# Patient Record
Sex: FEMALE | Race: AMERICAN INDIAN OR ALASKA NATIVE | NOT HISPANIC OR LATINO | URBAN - METROPOLITAN AREA
[De-identification: names, ages, dates, MRNs, and addresses within clinical notes are randomized per-mention and may not be internally consistent; named-entity substitution may affect disease eponyms.]

---

## 2017-05-15 ENCOUNTER — EMERGENCY (EMERGENCY)
Facility: HOSPITAL | Age: 32
LOS: 0 days | Discharge: HOME | End: 2017-05-15

## 2017-06-28 DIAGNOSIS — R11.2 NAUSEA WITH VOMITING, UNSPECIFIED: ICD-10-CM

## 2017-06-28 DIAGNOSIS — R51 HEADACHE: ICD-10-CM

## 2018-06-24 ENCOUNTER — EMERGENCY (EMERGENCY)
Facility: HOSPITAL | Age: 33
LOS: 0 days | Discharge: HOME | End: 2018-06-24
Attending: EMERGENCY MEDICINE | Admitting: EMERGENCY MEDICINE
Payer: COMMERCIAL

## 2018-06-24 VITALS
DIASTOLIC BLOOD PRESSURE: 82 MMHG | SYSTOLIC BLOOD PRESSURE: 140 MMHG | RESPIRATION RATE: 18 BRPM | HEART RATE: 95 BPM | TEMPERATURE: 98 F | OXYGEN SATURATION: 99 %

## 2018-06-24 DIAGNOSIS — M79.89 OTHER SPECIFIED SOFT TISSUE DISORDERS: ICD-10-CM

## 2018-06-24 DIAGNOSIS — M79.662 PAIN IN LEFT LOWER LEG: ICD-10-CM

## 2018-06-24 PROCEDURE — 93970 EXTREMITY STUDY: CPT | Mod: 26

## 2018-06-24 NOTE — ED PROVIDER NOTE - MUSCULOSKELETAL NEGATIVE STATEMENT, MLM
+ left leg pain and swelling, no back pain, no gout, no musculoskeletal pain, no neck pain, and no weakness.

## 2018-06-24 NOTE — ED PROVIDER NOTE - MEDICAL DECISION MAKING DETAILS
Chart finished.  31 yo woman with left lower leg pain after recent trip around the [place].   Patient states that she did a lot of hiking but also a lot of traveling.  ? swelling.  Pain with movement.  Duplex negative.  NSAIDS and outpatient follow up.

## 2018-06-24 NOTE — ED PROVIDER NOTE - OBJECTIVE STATEMENT
31 yo F with hx of migraines, presents for evaluation of left leg swelling, onset a couple days ago, associated with tenderness to the left calf. No fever, no chills, no SOB, no chest pain, no back pain, no headache, no n/v/d. Patient states that she was on a road trip for 21 days, where she was in a car driving for 4 to 6 hours daily and her last drive was from [place] to [place] which was 22 hours. Patient denies any BC, any hormonal use. Patient LMP 3 days ago.

## 2018-10-02 PROBLEM — Z00.00 ENCOUNTER FOR PREVENTIVE HEALTH EXAMINATION: Status: ACTIVE | Noted: 2018-10-02

## 2018-10-04 ENCOUNTER — EMERGENCY (EMERGENCY)
Facility: HOSPITAL | Age: 33
LOS: 0 days | Discharge: HOME | End: 2018-10-04
Attending: EMERGENCY MEDICINE | Admitting: EMERGENCY MEDICINE

## 2018-10-04 VITALS
SYSTOLIC BLOOD PRESSURE: 133 MMHG | DIASTOLIC BLOOD PRESSURE: 83 MMHG | OXYGEN SATURATION: 97 % | RESPIRATION RATE: 16 BRPM | HEART RATE: 95 BPM | TEMPERATURE: 98 F

## 2018-10-04 DIAGNOSIS — O03.9 COMPLETE OR UNSPECIFIED SPONTANEOUS ABORTION WITHOUT COMPLICATION: ICD-10-CM

## 2018-10-04 DIAGNOSIS — O20.9 HEMORRHAGE IN EARLY PREGNANCY, UNSPECIFIED: ICD-10-CM

## 2018-10-04 LAB
ALBUMIN SERPL ELPH-MCNC: 4.6 G/DL — SIGNIFICANT CHANGE UP (ref 3.5–5.2)
ALP SERPL-CCNC: 60 U/L — SIGNIFICANT CHANGE UP (ref 30–115)
ALT FLD-CCNC: 8 U/L — SIGNIFICANT CHANGE UP (ref 0–41)
ANION GAP SERPL CALC-SCNC: 12 MMOL/L — SIGNIFICANT CHANGE UP (ref 7–14)
APPEARANCE UR: ABNORMAL
AST SERPL-CCNC: 13 U/L — SIGNIFICANT CHANGE UP (ref 0–41)
BASOPHILS # BLD AUTO: 0.02 K/UL — SIGNIFICANT CHANGE UP (ref 0–0.2)
BASOPHILS NFR BLD AUTO: 0.3 % — SIGNIFICANT CHANGE UP (ref 0–1)
BILIRUB SERPL-MCNC: 0.6 MG/DL — SIGNIFICANT CHANGE UP (ref 0.2–1.2)
BILIRUB UR-MCNC: NEGATIVE — SIGNIFICANT CHANGE UP
BLD GP AB SCN SERPL QL: SIGNIFICANT CHANGE UP
BUN SERPL-MCNC: 13 MG/DL — SIGNIFICANT CHANGE UP (ref 10–20)
CALCIUM SERPL-MCNC: 9.3 MG/DL — SIGNIFICANT CHANGE UP (ref 8.5–10.1)
CHLORIDE SERPL-SCNC: 101 MMOL/L — SIGNIFICANT CHANGE UP (ref 98–110)
CO2 SERPL-SCNC: 25 MMOL/L — SIGNIFICANT CHANGE UP (ref 17–32)
COLOR SPEC: YELLOW — SIGNIFICANT CHANGE UP
CREAT SERPL-MCNC: 0.7 MG/DL — SIGNIFICANT CHANGE UP (ref 0.7–1.5)
DIFF PNL FLD: ABNORMAL
EOSINOPHIL # BLD AUTO: 0.19 K/UL — SIGNIFICANT CHANGE UP (ref 0–0.7)
EOSINOPHIL NFR BLD AUTO: 2.5 % — SIGNIFICANT CHANGE UP (ref 0–8)
EPI CELLS # UR: ABNORMAL /HPF
GLUCOSE SERPL-MCNC: 91 MG/DL — SIGNIFICANT CHANGE UP (ref 70–99)
GLUCOSE UR QL: NEGATIVE MG/DL — SIGNIFICANT CHANGE UP
HCG SERPL-ACNC: 6.5 MIU/ML — HIGH
HCT VFR BLD CALC: 39 % — SIGNIFICANT CHANGE UP (ref 37–47)
HGB BLD-MCNC: 12.4 G/DL — SIGNIFICANT CHANGE UP (ref 12–16)
IMM GRANULOCYTES NFR BLD AUTO: 0.3 % — SIGNIFICANT CHANGE UP (ref 0.1–0.3)
KETONES UR-MCNC: NEGATIVE — SIGNIFICANT CHANGE UP
LEUKOCYTE ESTERASE UR-ACNC: NEGATIVE — SIGNIFICANT CHANGE UP
LYMPHOCYTES # BLD AUTO: 2.23 K/UL — SIGNIFICANT CHANGE UP (ref 1.2–3.4)
LYMPHOCYTES # BLD AUTO: 29.5 % — SIGNIFICANT CHANGE UP (ref 20.5–51.1)
MCHC RBC-ENTMCNC: 25.2 PG — LOW (ref 27–31)
MCHC RBC-ENTMCNC: 31.8 G/DL — LOW (ref 32–37)
MCV RBC AUTO: 79.3 FL — LOW (ref 81–99)
MONOCYTES # BLD AUTO: 0.45 K/UL — SIGNIFICANT CHANGE UP (ref 0.1–0.6)
MONOCYTES NFR BLD AUTO: 6 % — SIGNIFICANT CHANGE UP (ref 1.7–9.3)
NEUTROPHILS # BLD AUTO: 4.65 K/UL — SIGNIFICANT CHANGE UP (ref 1.4–6.5)
NEUTROPHILS NFR BLD AUTO: 61.4 % — SIGNIFICANT CHANGE UP (ref 42.2–75.2)
NITRITE UR-MCNC: NEGATIVE — SIGNIFICANT CHANGE UP
NRBC # BLD: 0 /100 WBCS — SIGNIFICANT CHANGE UP (ref 0–0)
PH UR: 6 — SIGNIFICANT CHANGE UP (ref 5–8)
PLATELET # BLD AUTO: 264 K/UL — SIGNIFICANT CHANGE UP (ref 130–400)
POTASSIUM SERPL-MCNC: 4.1 MMOL/L — SIGNIFICANT CHANGE UP (ref 3.5–5)
POTASSIUM SERPL-SCNC: 4.1 MMOL/L — SIGNIFICANT CHANGE UP (ref 3.5–5)
PROT SERPL-MCNC: 7.2 G/DL — SIGNIFICANT CHANGE UP (ref 6–8)
PROT UR-MCNC: NEGATIVE MG/DL — SIGNIFICANT CHANGE UP
RBC # BLD: 4.92 M/UL — SIGNIFICANT CHANGE UP (ref 4.2–5.4)
RBC # FLD: 14.3 % — SIGNIFICANT CHANGE UP (ref 11.5–14.5)
RBC CASTS # UR COMP ASSIST: ABNORMAL /HPF
SODIUM SERPL-SCNC: 138 MMOL/L — SIGNIFICANT CHANGE UP (ref 135–146)
SP GR SPEC: 1.02 — SIGNIFICANT CHANGE UP (ref 1.01–1.03)
TYPE + AB SCN PNL BLD: SIGNIFICANT CHANGE UP
UROBILINOGEN FLD QL: 0.2 MG/DL — SIGNIFICANT CHANGE UP (ref 0.2–0.2)
WBC # BLD: 7.56 K/UL — SIGNIFICANT CHANGE UP (ref 4.8–10.8)
WBC # FLD AUTO: 7.56 K/UL — SIGNIFICANT CHANGE UP (ref 4.8–10.8)

## 2018-10-04 NOTE — ED PROVIDER NOTE - MEDICAL DECISION MAKING DETAILS
Presented for vaginal bleeding with positive home pregnancy test. UCG here negative. Beta 6.5. U/S with no IUP. Likely completed Ab. Has f/u scheduled with GYN.

## 2018-10-04 NOTE — ED PROVIDER NOTE - PHYSICAL EXAMINATION
CONSTITUTIONAL: WA / WN / NAD  HEAD: NCAT  EYES: PERRL; EOMI; anicteric.  ENT: Normal pharynx; mucous membranes pink/moist, no erythema.  NECK: Supple; no meningeal signs  CARD: RRR; nl S1/S2; no M/R/G.   RESP: Respiratory rate and effort are normal; breath sounds clear and equal bilaterally.  ABD: Soft, NT ND. No CVA tenderness  : Chaperoned by PCA Liv. Scant vaginal bleeding seen. No CMT or adenexal tenderness  MSK/EXT: No gross deformities; full range of motion.  SKIN: Warm and dry;   NEURO: AAOx3  PSYCH: Memory Intact, Normal Affect

## 2018-10-04 NOTE — ED PROVIDER NOTE - OBJECTIVE STATEMENT
32 year old female presents here for vaginal bleeding.  (history of an  1 year ago). LMP 18. Patient has had two positive pregnancy tests at home and only scheduled an appointment with an ob has not seen an ob. Today while at work patient began having back cramps and abdominal cramps and noticed vaginal bleeding. Denies fever chills vomiting urinary frequency/urgency burning. No history of any STDS.

## 2018-10-04 NOTE — ED PROVIDER NOTE - PROGRESS NOTE DETAILS
UCG negative in ED. Beta 6.5. Pt states she had 2 positive pregnancy tests at home. Likely complete Ab. Will f/u U/S.

## 2018-10-04 NOTE — ED ADULT NURSE NOTE - OBJECTIVE STATEMENT
patient reports having 2 home pregnancy test LMP 9/2 with nausea. was at work when she had lower abdominal cramping back pain and vaginal bleeding. denies urinary complaints abdomen soft non tender to palpation

## 2018-10-04 NOTE — ED PROVIDER NOTE - ATTENDING CONTRIBUTION TO CARE
33 y/o female, G2 P 0010, ETOP 1 year ago, at ~4 wks gestation (LMP 9/2). c/o left pelvic cramping with back cramping this AM associated with slight vaginal bleeding. No tissue. No light-headedness. No SOB. No fever.   O/E: Non-toxic in appearance. No pallor, no jaundice. Lungs CTA b/l. S1 S2 regular, no murmur. ABD soft, no tenderness/guarding/rebound. No skin rash. No neurologic deficits.    Imp: R/O ectopic. Possible spontaneous Ab.  A/P: Beta, U/S.

## 2018-10-04 NOTE — ED PROVIDER NOTE - NS ED ROS FT
Constitutional: See HPI.  ENMT: No neck pain   Cardiac: No SOB or cp  Respiratory: No cough or respiratory distress.   GI: No nausea, vomiting, diarrhea + abdominal cramping  : No dysuria, frequency or burning. + vaginal bleeding  MS: + back pain.  Neuro: No headache or weakness. No LOC.  Skin: No skin rash.

## 2018-10-24 ENCOUNTER — APPOINTMENT (OUTPATIENT)
Dept: OBGYN | Facility: CLINIC | Age: 33
End: 2018-10-24

## 2018-10-30 ENCOUNTER — OUTPATIENT (OUTPATIENT)
Dept: OUTPATIENT SERVICES | Facility: HOSPITAL | Age: 33
LOS: 1 days | Discharge: HOME | End: 2018-10-30

## 2018-10-30 ENCOUNTER — APPOINTMENT (OUTPATIENT)
Dept: INTERNAL MEDICINE | Facility: CLINIC | Age: 33
End: 2018-10-30

## 2018-12-29 ENCOUNTER — OUTPATIENT (OUTPATIENT)
Dept: OUTPATIENT SERVICES | Facility: HOSPITAL | Age: 33
LOS: 1 days | Discharge: HOME | End: 2018-12-29

## 2018-12-29 DIAGNOSIS — Z34.00 ENCOUNTER FOR SUPERVISION OF NORMAL FIRST PREGNANCY, UNSPECIFIED TRIMESTER: ICD-10-CM

## 2019-01-31 ENCOUNTER — EMERGENCY (EMERGENCY)
Facility: HOSPITAL | Age: 34
LOS: 0 days | Discharge: HOME | End: 2019-02-01
Attending: EMERGENCY MEDICINE | Admitting: EMERGENCY MEDICINE

## 2019-01-31 VITALS
WEIGHT: 136.03 LBS | OXYGEN SATURATION: 100 % | SYSTOLIC BLOOD PRESSURE: 120 MMHG | DIASTOLIC BLOOD PRESSURE: 75 MMHG | HEART RATE: 80 BPM | TEMPERATURE: 98 F | HEIGHT: 66 IN | RESPIRATION RATE: 18 BRPM

## 2019-01-31 DIAGNOSIS — R10.2 PELVIC AND PERINEAL PAIN: ICD-10-CM

## 2019-01-31 DIAGNOSIS — R10.32 LEFT LOWER QUADRANT PAIN: ICD-10-CM

## 2019-01-31 DIAGNOSIS — Z3A.14 14 WEEKS GESTATION OF PREGNANCY: ICD-10-CM

## 2019-01-31 DIAGNOSIS — O99.89 OTHER SPECIFIED DISEASES AND CONDITIONS COMPLICATING PREGNANCY, CHILDBIRTH AND THE PUERPERIUM: ICD-10-CM

## 2019-01-31 LAB
ALBUMIN SERPL ELPH-MCNC: 3.8 G/DL — SIGNIFICANT CHANGE UP (ref 3.5–5.2)
ALP SERPL-CCNC: 50 U/L — SIGNIFICANT CHANGE UP (ref 30–115)
ALT FLD-CCNC: 16 U/L — SIGNIFICANT CHANGE UP (ref 0–41)
ANION GAP SERPL CALC-SCNC: 19 MMOL/L — HIGH (ref 7–14)
APTT BLD: 24 SEC — LOW (ref 27–39.2)
AST SERPL-CCNC: 17 U/L — SIGNIFICANT CHANGE UP (ref 0–41)
BASOPHILS # BLD AUTO: 0.01 K/UL — SIGNIFICANT CHANGE UP (ref 0–0.2)
BASOPHILS NFR BLD AUTO: 0.1 % — SIGNIFICANT CHANGE UP (ref 0–1)
BILIRUB DIRECT SERPL-MCNC: <0.2 MG/DL — SIGNIFICANT CHANGE UP (ref 0–0.2)
BILIRUB INDIRECT FLD-MCNC: >0.3 MG/DL — SIGNIFICANT CHANGE UP (ref 0.2–1.2)
BILIRUB SERPL-MCNC: 0.5 MG/DL — SIGNIFICANT CHANGE UP (ref 0.2–1.2)
BUN SERPL-MCNC: 5 MG/DL — LOW (ref 10–20)
CALCIUM SERPL-MCNC: 9.2 MG/DL — SIGNIFICANT CHANGE UP (ref 8.5–10.1)
CHLORIDE SERPL-SCNC: 98 MMOL/L — SIGNIFICANT CHANGE UP (ref 98–110)
CO2 SERPL-SCNC: 21 MMOL/L — SIGNIFICANT CHANGE UP (ref 17–32)
CREAT SERPL-MCNC: 0.5 MG/DL — LOW (ref 0.7–1.5)
EOSINOPHIL # BLD AUTO: 0.13 K/UL — SIGNIFICANT CHANGE UP (ref 0–0.7)
EOSINOPHIL NFR BLD AUTO: 1.7 % — SIGNIFICANT CHANGE UP (ref 0–8)
GLUCOSE SERPL-MCNC: 81 MG/DL — SIGNIFICANT CHANGE UP (ref 70–99)
HCG SERPL-ACNC: HIGH MIU/ML
HCT VFR BLD CALC: 36 % — LOW (ref 37–47)
HGB BLD-MCNC: 12 G/DL — SIGNIFICANT CHANGE UP (ref 12–16)
IMM GRANULOCYTES NFR BLD AUTO: 0.3 % — SIGNIFICANT CHANGE UP (ref 0.1–0.3)
INR BLD: 1.04 RATIO — SIGNIFICANT CHANGE UP (ref 0.65–1.3)
LYMPHOCYTES # BLD AUTO: 1.89 K/UL — SIGNIFICANT CHANGE UP (ref 1.2–3.4)
LYMPHOCYTES # BLD AUTO: 24.7 % — SIGNIFICANT CHANGE UP (ref 20.5–51.1)
MCHC RBC-ENTMCNC: 25.9 PG — LOW (ref 27–31)
MCHC RBC-ENTMCNC: 33.3 G/DL — SIGNIFICANT CHANGE UP (ref 32–37)
MCV RBC AUTO: 77.8 FL — LOW (ref 81–99)
MONOCYTES # BLD AUTO: 0.49 K/UL — SIGNIFICANT CHANGE UP (ref 0.1–0.6)
MONOCYTES NFR BLD AUTO: 6.4 % — SIGNIFICANT CHANGE UP (ref 1.7–9.3)
NEUTROPHILS # BLD AUTO: 5.1 K/UL — SIGNIFICANT CHANGE UP (ref 1.4–6.5)
NEUTROPHILS NFR BLD AUTO: 66.8 % — SIGNIFICANT CHANGE UP (ref 42.2–75.2)
NRBC # BLD: 0 /100 WBCS — SIGNIFICANT CHANGE UP (ref 0–0)
PLATELET # BLD AUTO: 214 K/UL — SIGNIFICANT CHANGE UP (ref 130–400)
POTASSIUM SERPL-MCNC: 4.3 MMOL/L — SIGNIFICANT CHANGE UP (ref 3.5–5)
POTASSIUM SERPL-SCNC: 4.3 MMOL/L — SIGNIFICANT CHANGE UP (ref 3.5–5)
PROT SERPL-MCNC: 6.5 G/DL — SIGNIFICANT CHANGE UP (ref 6–8)
PROTHROM AB SERPL-ACNC: 12 SEC — SIGNIFICANT CHANGE UP (ref 9.95–12.87)
RBC # BLD: 4.63 M/UL — SIGNIFICANT CHANGE UP (ref 4.2–5.4)
RBC # FLD: 13.7 % — SIGNIFICANT CHANGE UP (ref 11.5–14.5)
SODIUM SERPL-SCNC: 138 MMOL/L — SIGNIFICANT CHANGE UP (ref 135–146)
WBC # BLD: 7.64 K/UL — SIGNIFICANT CHANGE UP (ref 4.8–10.8)
WBC # FLD AUTO: 7.64 K/UL — SIGNIFICANT CHANGE UP (ref 4.8–10.8)

## 2019-01-31 RX ORDER — IBUPROFEN 200 MG
400 TABLET ORAL ONCE
Qty: 0 | Refills: 0 | Status: DISCONTINUED | OUTPATIENT
Start: 2019-01-31 | End: 2019-02-01

## 2019-01-31 RX ORDER — SODIUM CHLORIDE 9 MG/ML
1000 INJECTION INTRAMUSCULAR; INTRAVENOUS; SUBCUTANEOUS ONCE
Qty: 0 | Refills: 0 | Status: COMPLETED | OUTPATIENT
Start: 2019-01-31 | End: 2019-01-31

## 2019-01-31 RX ADMIN — SODIUM CHLORIDE 1000 MILLILITER(S): 9 INJECTION INTRAMUSCULAR; INTRAVENOUS; SUBCUTANEOUS at 23:01

## 2019-01-31 NOTE — ED ADULT NURSE NOTE - OBJECTIVE STATEMENT
Pt c/o intermittent lower abd pain x couple of hours. Pt is 14wks pregnant. Pt c/o nausea, denies v/d. Denies vaginal bleeding. Denies trauma, denies fevers/chills. Pt states she feels movement within womb.

## 2019-01-31 NOTE — CONSULT NOTE ADULT - SUBJECTIVE AND OBJECTIVE BOX
DEVON FULLER   33y   Female   [id number]    Chief Complaint: Abdominal cramping at 13w1d pregnancy    HPI: 32 yo   at 13w1d w/ JOHNNY of 2019 by LMP consistent with 1st trimester sonogram here for cramping abdominal pain that started 3 days ago. In the beginning it was very mild, 3-4/10 intensity, would come and go for a couple of minutes. However, this morning at 0730, pt felt that the contractions were getting stronger, came to L&D and was told it is most likely due to dehydration as pt is experiencing severe N/V since the beginning of the pregnancy. Then this evening at 1955, pt woke up from a nap with 10/10 intensity cramping abdominal pain that is very similar to menstrual cramps, starts in the middle of her lower pelvic area and radiates to the sides. This lasted for 10-12 minutes and pt was brought into the ER by EMS. Denies vaginal bleeding, abnormal vaginal discharge, fever/chills, dysuria, frequency, urgency, diarrhea, SOB, chest pain, palpitations, cough, sore throat, sick contact and recent travel. Desired and planned pregnancy. No complications, pt following with Dr. Marin.     MEDICATIONS:  None    ALLERGIES:   NKDA    PAST MEDICAL & SURGICAL HISTORY:  Migraine - no ppx  Dilation and curettage    OB/GYN HISTORY:      Gyn: LMP: 10/31/2018, regular cycles; denies history of abnormal pap, STI, ovarian cysts, or uterine fibroids  Obstetric: ; ETOP w/ D&C; MAB w/o D&C   Last Pap smear: never had one    FAMILY HISTORY:  Hypertension - mother  Diabetes - mother  Endometrial cancer s/p hysterectomy - mother  Coronary artery disease - father    SOCIAL HISTORY:   Denies cigarette use, alcohol use, or illicit drug use    REVIEW OF SYSTEMS:  CONSTITUTIONAL: No weakness, fevers or chills  EYES/ENT: No visual changes;  No vertigo or throat pain   NECK: No pain or stiffness  RESPIRATORY: No cough, wheezing, hemoptysis; No shortness of breath  CARDIOVASCULAR: No chest pain or palpitations  GASTROINTESTINAL: No abdominal or epigastric pain. No hematemesis; No diarrhea or constipation. No melena or hematochezia.  GENITOURINARY: No dysuria, frequency or hematuria  NEUROLOGICAL: No numbness or weakness  SKIN: No itching, rashes  All other negative    Vital Signs Last 24 Hrs  T(C): 36.4 (2019 20:44), Max: 36.4 (2019 20:44)  T(F): 97.6 (2019 20:44), Max: 97.6 (2019 20:44)  HR: 80 (2019 20:44) (80 - 80)  BP: 120/75 (2019 20:44) (120/75 - 120/75)  RR: 18 (2019 20:44) (18 - 18)  SpO2: 100% (2019 20:44) (100% - 100%)    Physical Exam:  Constitutional: AAOx3, NAD  Gastrointestinal: Soft, nontender, nondistended, no rebound, guarding, or rigidity  Pelvic: Normal vulva, normal vagina, no bleeding, Cervix normal, closed, no bleeding, no abnormal discharge, Uterus anteverted, normal sized, no fundal tenderness, no adnexal masses or tenderness    LABS:                        12.0   7.64  )-----------( 214      ( 2019 22:42 )             36.0     HCG Quantitative, Serum: 09075.0 mIU/mL (19 @ 22:42)        138  |  98  |  5<L>  ----------------------------<  81  4.3   |  21  |  0.5<L>    Ca    9.2      2019 22:42    TPro  6.5  /  Alb  3.8  /  TBili  0.5  /  DBili  <0.2  /  AST  17  /  ALT  16  /  AlkPhos  50      PT/INR - ( 2019 22:42 )   PT: 12.00 sec;   INR: 1.04 ratio         PTT - ( 2019 22:42 )  PTT:24.0 sec      RADIOLOGY & ADDITIONAL STUDIES:    TVUS:    FINDINGS:     The uterus measures 13.7 x 10.4 x 6.8 cm containing a single intrauterine   pregnancy with crown-rump length measuring 6.9 cm corresponding to a   gestational age of 13 weeks and 1 day.  Fetal heart rate is 155   beats/min.     There is no adnexal mass or free pelvic fluid.    The right ovary is not visualized due to overlying bowel gas. The left   ovary measures 2.1 x 1.3 x 1.4 cm. containing a 1.9 cm complex cyst.    Doppler flow is demonstrated to left ovary.    IMPRESSION:  Single live intrauterine pregnancy corresponding to a gestational age of   13 weeks and 1 day.    Fetal heart rate at 155 beats/min.    1.9 cm left ovarian complex cyst. No evidence of left ovarian torsion.    Nonvisualization of the right ovary due to overlying bowel gas.    Note this examination is not tailored for detailed evaluation of fetal   anatomy.    RETROPERITONEAL US:    FINDINGS:    RIGHT KIDNEY: Normal in echogenicity, size measuring 9.5 cm in length. No   evidence of hydronephrosis, calculus or solid mass. Vascular flow is   demonstrated at the hilum.    LEFT KIDNEY: Normal in echogenicity, size measuring 10.9 cm in length. No   evidence of hydronephrosis, calculus or solid mass. Vascular flow is   demonstrated at the hilum.     URINARY BLADDER: Prevoid volume of approximately 269 cc.  No wall   thickening, debris or calculus is seen. Bilateral ureteral jets are   visualized. Postvoid volume is approximately 0 cc.    IMPRESSION:  Unremarkable renal and bladder ultrasound. DEVON FULLER   33y   Female   [id number]    Chief Complaint: Abdominal cramping at 13w1d pregnancy    HPI: 32 yo  at 13w1d w/ JOHNNY of 2019 by LMP consistent with 1st trimester sonogram here for cramping abdominal pain that started 3 days ago. In the beginning it was very mild, 3-4/10 intensity, would come and go for a couple of minutes. However, this morning at 0730, pt felt that the contractions were getting stronger, came to L&D and was told it is most likely due to dehydration as pt is experiencing severe N/V since the beginning of the pregnancy. Then this evening at 1955, pt woke up from a nap with 10/10 intensity cramping abdominal pain that is very similar to menstrual cramps, starts in the middle of her lower pelvic area and radiates to the sides. This lasted for 10-12 minutes and pt was brought into the ER by EMS. Denies vaginal bleeding, abnormal vaginal discharge, fever/chills, dysuria, frequency, urgency, diarrhea, SOB, chest pain, palpitations, cough, sore throat, sick contact and recent travel. Desired and planned pregnancy. No complications, pt following with Dr. Marin.     MEDICATIONS:  None    ALLERGIES:   NKDA    PAST MEDICAL & SURGICAL HISTORY:  Migraine - no ppx  Dilation and curettage    OB/GYN HISTORY:      Gyn: LMP: 10/31/2018, regular cycles; denies history of abnormal pap, STI, ovarian cysts, or uterine fibroids  Obstetric: ; ETOP w/ D&C; MAB w/o D&C   Last Pap smear: never had one    FAMILY HISTORY:  Hypertension - mother  Diabetes - mother  Endometrial cancer s/p hysterectomy - mother  Coronary artery disease - father    SOCIAL HISTORY:   Denies cigarette use, alcohol use, or illicit drug use    REVIEW OF SYSTEMS:  CONSTITUTIONAL: No weakness, fevers or chills  EYES/ENT: No visual changes;  No vertigo or throat pain   NECK: No pain or stiffness  RESPIRATORY: No cough, wheezing, hemoptysis; No shortness of breath  CARDIOVASCULAR: No chest pain or palpitations  GASTROINTESTINAL: No abdominal or epigastric pain. No hematemesis; No diarrhea or constipation. No melena or hematochezia.  GENITOURINARY: No dysuria, frequency or hematuria  NEUROLOGICAL: No numbness or weakness  SKIN: No itching, rashes  All other negative    Vital Signs Last 24 Hrs  T(C): 36.4 (2019 20:44), Max: 36.4 (2019 20:44)  T(F): 97.6 (2019 20:44), Max: 97.6 (2019 20:44)  HR: 80 (2019 20:44) (80 - 80)  BP: 120/75 (2019 20:44) (120/75 - 120/75)  RR: 18 (2019 20:44) (18 - 18)  SpO2: 100% (2019 20:44) (100% - 100%)    Physical Exam:  Constitutional: AAOx3, NAD  Gastrointestinal: Soft, nontender, nondistended, no rebound, guarding, or rigidity  Pelvic: Normal vulva, normal vagina, no bleeding, Cervix normal, closed, no bleeding, no abnormal discharge, no CMT, uterus anteverted, 13w-sized, no fundal tenderness, no adnexal masses or tenderness    LABS:                        12.0   7.64  )-----------( 214      ( 2019 22:42 )             36.0     HCG Quantitative, Serum: 35877.0 mIU/mL (19 @ 22:42)        138  |  98  |  5<L>  ----------------------------<  81  4.3   |  21  |  0.5<L>    Ca    9.2      2019 22:42    TPro  6.5  /  Alb  3.8  /  TBili  0.5  /  DBili  <0.2  /  AST  17  /  ALT  16  /  AlkPhos  50      PT/INR - ( 2019 22:42 )   PT: 12.00 sec;   INR: 1.04 ratio         PTT - ( 2019 22:42 )  PTT:24.0 sec      RADIOLOGY & ADDITIONAL STUDIES:    TVUS:    FINDINGS:     The uterus measures 13.7 x 10.4 x 6.8 cm containing a single intrauterine   pregnancy with crown-rump length measuring 6.9 cm corresponding to a   gestational age of 13 weeks and 1 day.  Fetal heart rate is 155   beats/min.     There is no adnexal mass or free pelvic fluid.    The right ovary is not visualized due to overlying bowel gas. The left   ovary measures 2.1 x 1.3 x 1.4 cm. containing a 1.9 cm complex cyst.    Doppler flow is demonstrated to left ovary.    IMPRESSION:  Single live intrauterine pregnancy corresponding to a gestational age of   13 weeks and 1 day.    Fetal heart rate at 155 beats/min.    1.9 cm left ovarian complex cyst. No evidence of left ovarian torsion.    Nonvisualization of the right ovary due to overlying bowel gas.    Note this examination is not tailored for detailed evaluation of fetal   anatomy.    RETROPERITONEAL US:    FINDINGS:    RIGHT KIDNEY: Normal in echogenicity, size measuring 9.5 cm in length. No   evidence of hydronephrosis, calculus or solid mass. Vascular flow is   demonstrated at the hilum.    LEFT KIDNEY: Normal in echogenicity, size measuring 10.9 cm in length. No   evidence of hydronephrosis, calculus or solid mass. Vascular flow is   demonstrated at the hilum.     URINARY BLADDER: Prevoid volume of approximately 269 cc.  No wall   thickening, debris or calculus is seen. Bilateral ureteral jets are   visualized. Postvoid volume is approximately 0 cc.    IMPRESSION:  Unremarkable renal and bladder ultrasound.

## 2019-01-31 NOTE — ED ADULT NURSE NOTE - PSH
No significant past surgical history
Anxiety    Chronic cough    Hypothyroidism    IBS (irritable bowel syndrome)    Tracheomalacia

## 2019-01-31 NOTE — ED ADULT TRIAGE NOTE - CHIEF COMPLAINT QUOTE
Pt c/o lower abdominal pain and is 14 weeks pregnant. pain started approximately 1 hr ago Pt admits to nausea but denies vomiting and vaginal bleeding. Pt states " pain was worse before now it has improved"

## 2019-01-31 NOTE — ED ADULT TRIAGE NOTE - NS ED NURSE BANDS TYPE
Health Maintenance Summary     Topic Due On Due Status Completed On Postpone Until Reason    MAMMOGRAM - BREAST CANCER SCREENING Oct 24, 2018 Not Due Oct 24, 2016      Osteoporosis Screening Sep 20, 2015 Postponed  Jan 9, 2017 Patient Refused    Colorectal Cancer Screening - Blood in Stool Test Jun 27, 2017 Not Due Jun 27, 2016      Immunization - Td/Tdap Jul 17, 2021 Not Due Jul 17, 2011      Immunization-Zoster Sep 20, 2010 Postponed  Nov 30, 2017 Patient Refused    Immunization - Pneumococcal Sep 20, 2015 Postponed  Nov 30, 2017 Patient Refused    Immunization - TDAP Pregnancy  Hidden       Medicare Wellness Visit Jan 9, 2018 Not Due Jan 9, 2017      Immunization-Influenza Sep 1, 2016 Postponed  Apr 1, 2017 Patient Refused          Patient is due for topics as listed above, she wishes to decline at this time .    Health Maintenance Summary     Topic Due On Due Status Completed On Postpone Until Reason    MAMMOGRAM - BREAST CANCER SCREENING Oct 24, 2018 Not Due Oct 24, 2016      Osteoporosis Screening Sep 20, 2015 Postponed  Jan 9, 2017 Patient Refused    Colorectal Cancer Screening - Blood in Stool Test Jun 27, 2017 Not Due Jun 27, 2016      Immunization - Td/Tdap Jul 17, 2021 Not Due Jul 17, 2011      Immunization-Zoster Sep 20, 2010 Postponed  Nov 30, 2017 Patient Refused    Immunization - Pneumococcal Sep 20, 2015 Postponed  Nov 30, 2017 Patient Refused    Immunization - TDAP Pregnancy  Hidden       Medicare Wellness Visit Jan 9, 2018 Not Due Jan 9, 2017      Immunization-Influenza Sep 1, 2016 Postponed  Apr 1, 2017 Patient Refused          Patient is due for topics as listed above, she wishes to decline at this time .       Name band;

## 2019-01-31 NOTE — ED ADULT NURSE NOTE - NSIMPLEMENTINTERV_GEN_ALL_ED
Implemented All Universal Safety Interventions:  [place] to call system. Call bell, personal items and telephone within reach. Instruct patient to call for assistance. Room bathroom lighting operational. Non-slip footwear when patient is off stretcher. Physically safe environment: no spills, clutter or unnecessary equipment. Stretcher in lowest position, wheels locked, appropriate side rails in place.

## 2019-01-31 NOTE — CONSULT NOTE ADULT - ASSESSMENT
34 yo  at 13w1d w/ JOHNNY of 2019 w/ abdominal cramping, most likely due to dehydration, hemodynamically and clinically stable,     -F/u w/ PMD as scheduled  -Encourage hydration  -Miscarriage and ovarian torsion precautions given  -Disposition of pt per the ED team    Dr. Martinez and Dr. Marin aware.

## 2019-02-01 VITALS — HEART RATE: 80 BPM | RESPIRATION RATE: 18 BRPM | DIASTOLIC BLOOD PRESSURE: 69 MMHG | SYSTOLIC BLOOD PRESSURE: 105 MMHG

## 2019-02-01 LAB
APPEARANCE UR: ABNORMAL
BILIRUB UR-MCNC: NEGATIVE — SIGNIFICANT CHANGE UP
BLD GP AB SCN SERPL QL: SIGNIFICANT CHANGE UP
COLOR SPEC: YELLOW — SIGNIFICANT CHANGE UP
DIFF PNL FLD: NEGATIVE — SIGNIFICANT CHANGE UP
EPI CELLS # UR: ABNORMAL /HPF
GLUCOSE UR QL: NEGATIVE MG/DL — SIGNIFICANT CHANGE UP
KETONES UR-MCNC: 40
LEUKOCYTE ESTERASE UR-ACNC: NEGATIVE — SIGNIFICANT CHANGE UP
NITRITE UR-MCNC: NEGATIVE — SIGNIFICANT CHANGE UP
PH UR: 6 — SIGNIFICANT CHANGE UP (ref 5–8)
PROT UR-MCNC: NEGATIVE MG/DL — SIGNIFICANT CHANGE UP
SP GR SPEC: 1.01 — SIGNIFICANT CHANGE UP (ref 1.01–1.03)
TYPE + AB SCN PNL BLD: SIGNIFICANT CHANGE UP
UROBILINOGEN FLD QL: 0.2 MG/DL — SIGNIFICANT CHANGE UP (ref 0.2–0.2)

## 2019-02-01 RX ADMIN — SODIUM CHLORIDE 1000 MILLILITER(S): 9 INJECTION INTRAMUSCULAR; INTRAVENOUS; SUBCUTANEOUS at 00:23

## 2019-02-01 NOTE — ED PROVIDER NOTE - GASTROINTESTINAL, MLM
+BS, mild suprapubic and LLQ tenderness to deep palpation, no rebound, no guarding, ND, soft, no CVA tenderness.

## 2019-02-01 NOTE — ED PROVIDER NOTE - OBJECTIVE STATEMENT
patient states that 3 days ago started having lower abd cramping, had called her OBGYN doctor, was told may be from dehydration and was encouraged to drink adequate liquids to hydrate orally, patient states that she has been doing it, today morning pain got worse, so went to the L&D and did not see the doctors since they were busy with shift change, so she went home, around 1130 am had another episode of severe pain, which improved, later in the evening started having severe lower abdominal cramping, mostly LLQ area with radiation to LT flank, which continued, took tylenol and 911 was called, was brought to ED for evaluation. Patient states that pain had improved by the time she arrived to the ED, and now feels better, denies vaginal bleeding, denies vaginal d/c, denies urinary symptoms. Patient is 14 wks pregnant, stated had recently US and OBGYN evaluation, was told 13 wks and 4 days, no travel, no trauma, no sick contacts.

## 2019-02-01 NOTE — ED PROVIDER NOTE - MEDICAL DECISION MAKING DETAILS
Patient remained stable in ER, improved well during the course of ER stay. Patient is seen by OBGYN and cleared by them for discharge, Patient states is feeling lot better, want to go home and f/u as out-patient. Patient is awake, alert, o x 3, ambulatory comfortable, tolerated PO. Discussed with patient in detail about his clinical condition, results of the diagnostic studies and the need for close out-patient follow up. Detail aftercare instructions and return precautions are given.

## 2019-02-02 LAB
CULTURE RESULTS: SIGNIFICANT CHANGE UP
SPECIMEN SOURCE: SIGNIFICANT CHANGE UP

## 2019-05-18 ENCOUNTER — OUTPATIENT (OUTPATIENT)
Dept: OUTPATIENT SERVICES | Facility: HOSPITAL | Age: 34
LOS: 1 days | Discharge: HOME | End: 2019-05-18

## 2019-05-18 DIAGNOSIS — Z34.00 ENCOUNTER FOR SUPERVISION OF NORMAL FIRST PREGNANCY, UNSPECIFIED TRIMESTER: ICD-10-CM

## 2019-05-20 ENCOUNTER — EMERGENCY (EMERGENCY)
Facility: HOSPITAL | Age: 34
LOS: 0 days | Discharge: HOME | End: 2019-05-20
Attending: EMERGENCY MEDICINE | Admitting: EMERGENCY MEDICINE
Payer: COMMERCIAL

## 2019-05-20 DIAGNOSIS — O99.89 OTHER SPECIFIED DISEASES AND CONDITIONS COMPLICATING PREGNANCY, CHILDBIRTH AND THE PUERPERIUM: ICD-10-CM

## 2019-05-20 DIAGNOSIS — R09.81 NASAL CONGESTION: ICD-10-CM

## 2019-05-20 LAB
FLU A RESULT: NEGATIVE — SIGNIFICANT CHANGE UP
FLU A RESULT: NEGATIVE — SIGNIFICANT CHANGE UP
FLUAV AG NPH QL: NEGATIVE — SIGNIFICANT CHANGE UP
FLUBV AG NPH QL: NEGATIVE — SIGNIFICANT CHANGE UP
RSV RESULT: NEGATIVE — SIGNIFICANT CHANGE UP
RSV RNA RESP QL NAA+PROBE: NEGATIVE — SIGNIFICANT CHANGE UP

## 2019-05-20 PROCEDURE — 99283 EMERGENCY DEPT VISIT LOW MDM: CPT

## 2019-05-20 NOTE — ED PROVIDER NOTE - NS ED ROS FT
Review of Systems:  •	CONSTITUTIONAL - No fever, No diaphoresis, No weight change  •	SKIN - No rash  •	HEMATOLOGIC - No abnormal bleeding or bruising  •	EYES - No eye pain, No blurred vision  •	ENT - as per hpi  •	RESPIRATORY - No shortness of breath, No cough  •	CARDIAC -No chest pain, No palpitations  •	GI - No abdominal pain, No nausea, No vomiting, No diarrhea, No constipation, No bright red blood per rectum or melena. No flank pain  •                 - No dysuria, frequency, hematuria.   •	ENDO - No polydypsia, No polyuria, No heat/cold intolerance  •	MUSCULOSKELETAL - No joint paint, No swelling, No back pain  •	NEUROLOGIC - No numbness, No focal weakness, No headache, No dizziness  All other systems negative, unless specified in HPI

## 2019-05-20 NOTE — ED PROVIDER NOTE - ATTENDING CONTRIBUTION TO CARE
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 +tactile temp. +nasal congestion.  pt is pregnant.    pt well appearing, ambulating w/o distress, breathing comfortably on RA.  No current preg concerns.    flu swab --> tamiflu if pos given pregnancy.

## 2019-05-20 NOTE — ED ADULT NURSE NOTE - OBJECTIVE STATEMENT
pt requesting flu swab, second trimester of pregnancy complaining of body aches chills. +sick contacts. refused VS

## 2019-06-01 ENCOUNTER — OUTPATIENT (OUTPATIENT)
Dept: OUTPATIENT SERVICES | Facility: HOSPITAL | Age: 34
LOS: 1 days | Discharge: HOME | End: 2019-06-01

## 2019-06-01 DIAGNOSIS — Z34.00 ENCOUNTER FOR SUPERVISION OF NORMAL FIRST PREGNANCY, UNSPECIFIED TRIMESTER: ICD-10-CM

## 2019-06-05 ENCOUNTER — OUTPATIENT (OUTPATIENT)
Dept: OUTPATIENT SERVICES | Facility: HOSPITAL | Age: 34
LOS: 1 days | Discharge: HOME | End: 2019-06-05

## 2019-06-05 VITALS
HEART RATE: 75 BPM | SYSTOLIC BLOOD PRESSURE: 125 MMHG | DIASTOLIC BLOOD PRESSURE: 75 MMHG | RESPIRATION RATE: 18 BRPM | TEMPERATURE: 98 F

## 2019-06-05 VITALS — DIASTOLIC BLOOD PRESSURE: 77 MMHG | SYSTOLIC BLOOD PRESSURE: 122 MMHG | HEART RATE: 69 BPM

## 2019-06-05 DIAGNOSIS — Z98.890 OTHER SPECIFIED POSTPROCEDURAL STATES: Chronic | ICD-10-CM

## 2019-06-05 NOTE — OB PROVIDER TRIAGE NOTE - HISTORY OF PRESENT ILLNESS
32 yo  at 31w1d by LMP consistent with 1st trimester sonogram here for contractions that she felt at first at 2200 and then at 0030. Reports that they are resolving now, and were very mild 3/10 intensity, but they did scare her because she could feel her stomach tightening and felt like going to the bathroom more frequently than usual. Denies LOF and VB. Reports good FM. No complications in this pregnancy. Last BM was at 1600, last PO intake was at 2330 and last intercourse was 2 weeks ago. Denies fever/chills, N/V, diarrhea, abnormal vaginal discharge, dysuria, urgency, hematuria, SOB, palpitations, chest pain, cough, sore throat, runny nose and recent travel. Last PMD visit was on 6/3/2019, no VE was done.     SH: denies tobacco, alcohol and illicit drug use  Meds: none  Allergies: NKDA

## 2019-06-05 NOTE — OB PROVIDER TRIAGE NOTE - NSOBPROVIDERNOTE_OBGYN_ALL_OB_FT
32 yo  at 31w1d, GBS unknown, not in  labor,     - labor precautions/fetal kick count instructions given  -PO hydration/ambulation encouraged  -F/u w/ PMD as scheduled   -Discharge home    Dr. Ferro to be made aware. Dr. Marin aware.

## 2019-06-05 NOTE — OB PROVIDER TRIAGE NOTE - NSHPPHYSICALEXAM_GEN_ALL_CORE
Vital Signs Last 24 Hrs  T(F): 98.2 (05 Jun 2019 03:11) (98.2 - 98.2)  HR: 69 (05 Jun 2019 03:11) (69 - 75)  BP: 122/77 (05 Jun 2019 03:11) (122/77 - 125/75)  RR: 18 (05 Jun 2019 02:11) (18 - 18)    Udip: neg  EFM: 140/mod/accel+  [place]: occasional  SVE: C/L/P  Speculum: deferred due to pt preference and PMD request  Abd: NT, gravid, no palpable contractions  Bedside sono: deferred due to pt preference and PMD request

## 2019-06-05 NOTE — OB PROVIDER TRIAGE NOTE - NS_OBGYNHISTORY_OBGYN_ALL_OB_FT
OB Hx: ETOP x1 w/ D&C; chemical pregnancy?/SABx1 w/o D&C  GYN Hx: denies h/o fibroids, ovarian cysts, abnormal paps and STIs

## 2019-06-05 NOTE — OB PROVIDER TRIAGE NOTE - FAMILY HISTORY
Mother  Still living? Unknown  Family history of malignant neoplasm of endometrium, Age at diagnosis: Age Unknown  Family history of diabetes mellitus, Age at diagnosis: Age Unknown  Family history of hypertension, Age at diagnosis: Age Unknown     Father  Still living? Unknown  Family history of diabetes mellitus, Age at diagnosis: Age Unknown  Family history of hypertension, Age at diagnosis: Age Unknown

## 2019-07-04 ENCOUNTER — OUTPATIENT (OUTPATIENT)
Dept: OUTPATIENT SERVICES | Facility: HOSPITAL | Age: 34
LOS: 1 days | Discharge: HOME | End: 2019-07-04

## 2019-07-04 DIAGNOSIS — Z98.890 OTHER SPECIFIED POSTPROCEDURAL STATES: Chronic | ICD-10-CM

## 2019-07-04 DIAGNOSIS — N89.8 OTHER SPECIFIED NONINFLAMMATORY DISORDERS OF VAGINA: ICD-10-CM

## 2019-07-04 PROBLEM — G43.909 MIGRAINE, UNSPECIFIED, NOT INTRACTABLE, WITHOUT STATUS MIGRAINOSUS: Chronic | Status: ACTIVE | Noted: 2019-06-05

## 2019-07-26 ENCOUNTER — OUTPATIENT (OUTPATIENT)
Dept: OUTPATIENT SERVICES | Facility: HOSPITAL | Age: 34
LOS: 1 days | Discharge: HOME | End: 2019-07-26

## 2019-07-26 VITALS
HEART RATE: 73 BPM | SYSTOLIC BLOOD PRESSURE: 139 MMHG | TEMPERATURE: 99 F | DIASTOLIC BLOOD PRESSURE: 86 MMHG | RESPIRATION RATE: 18 BRPM

## 2019-07-26 VITALS — SYSTOLIC BLOOD PRESSURE: 139 MMHG | DIASTOLIC BLOOD PRESSURE: 86 MMHG | HEART RATE: 73 BPM

## 2019-07-26 DIAGNOSIS — Z98.890 OTHER SPECIFIED POSTPROCEDURAL STATES: Chronic | ICD-10-CM

## 2019-07-26 NOTE — OB PROVIDER TRIAGE NOTE - ADDITIONAL INSTRUCTIONS
- f/up at next scheduled appt w/ Dr. Marin 8/1  - labor precautions  - [facility] instructions, maternal hydration encouraged

## 2019-07-26 NOTE — OB PROVIDER TRIAGE NOTE - HISTORY OF PRESENT ILLNESS
32 yo  @38w2d by LMP and c/w  tri sono presents to L&D with complaint of loss of mucus plug at 1130 this morning. Also reports some minor back that began after exam yesterday in the office. Denies ctx, LOF, VB. Reports good fetal movements. No complications during this pregnancy. Was 2cm yesterday in office. GBS neg.

## 2019-07-26 NOTE — OB PROVIDER TRIAGE NOTE - NSHPLABSRESULTS_GEN_ALL_CORE
Labs:    Julio Cesar: Labs:  7/5/19  GBS: neg    6/1/19  measles: immune  HIV: NR    5/18/19  GCT: 109    12/29/18  blood type: O pos  Ab screen: neg  HIV: NR  RPR: neg  rubella: immune  varicella: immune  HepB: NR       seq1/2: low risk    Sonos:  33w: EFW 2266gms (56%), cephalic, 3vc, ant palcenta, MVP 5.8cm, BPP 8/8  18w5d: EFW 275gms, transverse, 3vc, ant placenta, nml fluid, cvx 3.7cm, nml anatomy  12w5d: single IUP, +FH, nt 1.3mm wnl

## 2019-07-26 NOTE — OB RN TRIAGE NOTE - FAMILY HISTORY
Mother  Still living? Unknown  Family history of malignant neoplasm of endometrium, Age at diagnosis: Age Unknown  Family history of diabetes mellitus, Age at diagnosis: Age Unknown  Family history of hypertension, Age at diagnosis: Age Unknown     Father  Still living? Unknown  Family history of diabetes mellitus, Age at diagnosis: Age Unknown  Family history of hypertension, Age at diagnosis: Age Unknown  Family history of myasthenia gravis, Age at diagnosis: Age Unknown

## 2019-07-26 NOTE — OB PROVIDER TRIAGE NOTE - NSOBPROVIDERNOTE_OBGYN_ALL_OB_FT
34 yo  @38w2d, GBS neg, measles immune, not in labor, reassuring fetal and maternal status.    - d/c to home  - f/up at next scheduled appt w/ Dr. Marin   - labor precautions  - [facility] instructions, maternal hydration encouraged    Dr. Velarde and Dr. Marin aware.

## 2019-07-26 NOTE — OB PROVIDER TRIAGE NOTE - NSHPPHYSICALEXAM_GEN_ALL_CORE
Vital Signs Last 24 Hrs  T(F): 98.9 (26 Jul 2019 13:23), Max: 98.9 (26 Jul 2019 13:23)  HR: 73 (26 Jul 2019 13:25) (73 - 73)  BP: 139/86 (26 Jul 2019 13:25) (139/86 - 139/86)  RR: 18 (26 Jul 2019 13:23) (18 - 18)    udip: trace blood    gen: AAOx3, nad  EFM: 130/mod mandeep/+accel  toco: irregular  SVE: 2/L/-3, vtx, intact  abd: soft, nontender, gravid, no palpable contractions

## 2019-07-28 ENCOUNTER — INPATIENT (INPATIENT)
Facility: HOSPITAL | Age: 34
LOS: 2 days | Discharge: HOME | End: 2019-07-31
Attending: OBSTETRICS & GYNECOLOGY | Admitting: OBSTETRICS & GYNECOLOGY

## 2019-07-28 VITALS — TEMPERATURE: 98 F

## 2019-07-28 DIAGNOSIS — Z98.890 OTHER SPECIFIED POSTPROCEDURAL STATES: Chronic | ICD-10-CM

## 2019-07-28 LAB
APPEARANCE UR: ABNORMAL
BACTERIA # UR AUTO: ABNORMAL
BASOPHILS # BLD AUTO: 0.01 K/UL — SIGNIFICANT CHANGE UP (ref 0–0.2)
BASOPHILS NFR BLD AUTO: 0.1 % — SIGNIFICANT CHANGE UP (ref 0–1)
BILIRUB UR-MCNC: NEGATIVE — SIGNIFICANT CHANGE UP
BLD GP AB SCN SERPL QL: SIGNIFICANT CHANGE UP
COLOR SPEC: SIGNIFICANT CHANGE UP
DIFF PNL FLD: ABNORMAL
EOSINOPHIL # BLD AUTO: 0.09 K/UL — SIGNIFICANT CHANGE UP (ref 0–0.7)
EOSINOPHIL NFR BLD AUTO: 1.2 % — SIGNIFICANT CHANGE UP (ref 0–8)
EPI CELLS # UR: 4 /HPF — SIGNIFICANT CHANGE UP (ref 0–5)
GLUCOSE UR QL: NEGATIVE — SIGNIFICANT CHANGE UP
HCT VFR BLD CALC: 33.3 % — LOW (ref 37–47)
HGB BLD-MCNC: 10.2 G/DL — LOW (ref 12–16)
HYALINE CASTS # UR AUTO: 2 /LPF — SIGNIFICANT CHANGE UP (ref 0–7)
IMM GRANULOCYTES NFR BLD AUTO: 0.5 % — HIGH (ref 0.1–0.3)
KETONES UR-MCNC: NEGATIVE — SIGNIFICANT CHANGE UP
LEUKOCYTE ESTERASE UR-ACNC: ABNORMAL
LYMPHOCYTES # BLD AUTO: 1.85 K/UL — SIGNIFICANT CHANGE UP (ref 1.2–3.4)
LYMPHOCYTES # BLD AUTO: 25.1 % — SIGNIFICANT CHANGE UP (ref 20.5–51.1)
MCHC RBC-ENTMCNC: 21.6 PG — LOW (ref 27–31)
MCHC RBC-ENTMCNC: 30.6 G/DL — LOW (ref 32–37)
MCV RBC AUTO: 70.4 FL — LOW (ref 81–99)
MONOCYTES # BLD AUTO: 0.43 K/UL — SIGNIFICANT CHANGE UP (ref 0.1–0.6)
MONOCYTES NFR BLD AUTO: 5.8 % — SIGNIFICANT CHANGE UP (ref 1.7–9.3)
NEUTROPHILS # BLD AUTO: 4.95 K/UL — SIGNIFICANT CHANGE UP (ref 1.4–6.5)
NEUTROPHILS NFR BLD AUTO: 67.3 % — SIGNIFICANT CHANGE UP (ref 42.2–75.2)
NITRITE UR-MCNC: NEGATIVE — SIGNIFICANT CHANGE UP
NRBC # BLD: 0 /100 WBCS — SIGNIFICANT CHANGE UP (ref 0–0)
PH UR: 6.5 — SIGNIFICANT CHANGE UP (ref 5–8)
PLATELET # BLD AUTO: 236 K/UL — SIGNIFICANT CHANGE UP (ref 130–400)
PRENATAL SYPHILIS TEST: SIGNIFICANT CHANGE UP
PROT UR-MCNC: NEGATIVE — SIGNIFICANT CHANGE UP
RBC # BLD: 4.73 M/UL — SIGNIFICANT CHANGE UP (ref 4.2–5.4)
RBC # FLD: 15.9 % — HIGH (ref 11.5–14.5)
RBC CASTS # UR COMP ASSIST: 1 /HPF — SIGNIFICANT CHANGE UP (ref 0–4)
SP GR SPEC: 1.01 — SIGNIFICANT CHANGE UP (ref 1.01–1.02)
UROBILINOGEN FLD QL: SIGNIFICANT CHANGE UP
WBC # BLD: 7.37 K/UL — SIGNIFICANT CHANGE UP (ref 4.8–10.8)
WBC # FLD AUTO: 7.37 K/UL — SIGNIFICANT CHANGE UP (ref 4.8–10.8)
WBC UR QL: 37 /HPF — HIGH (ref 0–5)

## 2019-07-28 RX ORDER — DIPHENHYDRAMINE HCL 50 MG
25 CAPSULE ORAL ONCE
Refills: 0 | Status: COMPLETED | OUTPATIENT
Start: 2019-07-28 | End: 2019-07-28

## 2019-07-28 RX ORDER — BUTORPHANOL TARTRATE 2 MG/ML
2 INJECTION, SOLUTION INTRAMUSCULAR; INTRAVENOUS ONCE
Refills: 0 | Status: DISCONTINUED | OUTPATIENT
Start: 2019-07-28 | End: 2019-07-28

## 2019-07-28 RX ORDER — SODIUM CHLORIDE 9 MG/ML
1000 INJECTION, SOLUTION INTRAVENOUS
Refills: 0 | Status: DISCONTINUED | OUTPATIENT
Start: 2019-07-28 | End: 2019-07-29

## 2019-07-28 RX ORDER — DEXAMETHASONE 0.5 MG/5ML
4 ELIXIR ORAL EVERY 6 HOURS
Refills: 0 | Status: DISCONTINUED | OUTPATIENT
Start: 2019-07-28 | End: 2019-07-29

## 2019-07-28 RX ORDER — ONDANSETRON 8 MG/1
4 TABLET, FILM COATED ORAL EVERY 6 HOURS
Refills: 0 | Status: COMPLETED | OUTPATIENT
Start: 2019-07-28 | End: 2019-07-29

## 2019-07-28 RX ORDER — OXYTOCIN 10 UNIT/ML
2 VIAL (ML) INJECTION
Qty: 30 | Refills: 0 | Status: DISCONTINUED | OUTPATIENT
Start: 2019-07-28 | End: 2019-07-29

## 2019-07-28 RX ORDER — NALOXONE HYDROCHLORIDE 4 MG/.1ML
0.1 SPRAY NASAL
Refills: 0 | Status: DISCONTINUED | OUTPATIENT
Start: 2019-07-28 | End: 2019-07-29

## 2019-07-28 RX ORDER — OXYTOCIN 10 UNIT/ML
333.33 VIAL (ML) INJECTION
Qty: 20 | Refills: 0 | Status: DISCONTINUED | OUTPATIENT
Start: 2019-07-28 | End: 2019-07-29

## 2019-07-28 RX ORDER — DIPHENHYDRAMINE HCL 50 MG
50 CAPSULE ORAL ONCE
Refills: 0 | Status: COMPLETED | OUTPATIENT
Start: 2019-07-28 | End: 2019-07-28

## 2019-07-28 RX ADMIN — Medication 2 MILLIUNIT(S)/MIN: at 06:08

## 2019-07-28 RX ADMIN — BUTORPHANOL TARTRATE 2 MILLIGRAM(S): 2 INJECTION, SOLUTION INTRAMUSCULAR; INTRAVENOUS at 06:06

## 2019-07-28 RX ADMIN — Medication 50 MILLIGRAM(S): at 06:06

## 2019-07-28 RX ADMIN — SODIUM CHLORIDE 125 MILLILITER(S): 9 INJECTION, SOLUTION INTRAVENOUS at 06:07

## 2019-07-28 RX ADMIN — BUTORPHANOL TARTRATE 2 MILLIGRAM(S): 2 INJECTION, SOLUTION INTRAMUSCULAR; INTRAVENOUS at 18:04

## 2019-07-28 RX ADMIN — Medication 25 MILLIGRAM(S): at 18:05

## 2019-07-28 NOTE — OB PROVIDER H&P - ATTENDING COMMENTS
IMP: IUP @ 38.5 wks, Prolonged Latent Phase of Labor, Favorable Cervix at Term    Plan: Admit, Pitocin Induction, Pain Management, Anticipated

## 2019-07-28 NOTE — PROCEDURE NOTE - NSANESTHEXAM_OBGYN_ALL_OB_FT
Mildly obese [place] parturient in active labor.  A & O X   Chest:  BLBS, CTA  Cor:  Reg. S1S2 no murmur

## 2019-07-28 NOTE — PROGRESS NOTE ADULT - ASSESSMENT
A/P:   33y  at 38w5d, GBS neg, for IOL with pitocin.    -continue pitocin  -pain management prn  -cont efm/toco  -cont to monitor vitals  -cont iv hydration, clear liquid diet    Will make Dr. Wilson and Dr. Marin aware. 33y  at 38w5d, GBS neg, for IOL with pitocin.    -continue pitocin  -pain management prn  -cont efm/toco  -cont to monitor vitals  -cont iv hydration, clear liquid diet    Will make Dr. Wilson and Dr. Marin aware.

## 2019-07-28 NOTE — PROCEDURE NOTE - ADDITIONAL PROCEDURE DETAILS
Fentanyl 150 mcg added to reservoir bag for continuous epidural infusion.  Infusion rate 15 ml per hour. Fentanyl 150 mcg added to reservoir bag for continuous epidural infusion.  Infusion rate 15 ml per hour.  Continuous epidural infusion rate reduced to 8 ml per hour at 1215 due to patient medication sensitivity and possibility of high level epidural blockade. Fentanyl 150 mcg added to reservoir bag for continuous epidural infusion.  Infusion rate 15 ml per hour.  Continuous epidural infusion rate reduced to 8 ml per hour at 1215 due to patient medication sensitivity and possibility of high level epidural blockade.  Continuous epidural infusion increased to 12 ml per hour at request of Dr. Marin.

## 2019-07-28 NOTE — OB PROVIDER H&P - FAMILY HISTORY
Mother  Still living? Unknown  Family history of diabetes mellitus, Age at diagnosis: Age Unknown  Family history of hypertension, Age at diagnosis: Age Unknown  Family history of osteoarthritis, Age at diagnosis: Age Unknown     Father  Still living? Unknown  Family history of diabetes mellitus, Age at diagnosis: Age Unknown  Family history of hypertension, Age at diagnosis: Age Unknown  Family history of myasthenia gravis, Age at diagnosis: Age Unknown  Family history of coronary artery disease, Age at diagnosis: Age Unknown

## 2019-07-28 NOTE — PROGRESS NOTE ADULT - ASSESSMENT
A/P:   33y  at 38w5d, GBS neg, measles immune, s/p epidural, on pitocin, in labor progressing well.  -Continue current management   -Pain management prn  -Continuous EFM/toco  -F/u pending labs  -Reevaluate     Dr. Hunt aware and Dr. Marin to be made aware. A/P:   33y  at 38w5d, GBS neg, measles immune, s/p epidural, on pitocin, in labor progressing well.  -Continue current management   -Pain management prn  -Continuous EFM/toco  -F/u pending labs    Dr. Hunt aware and Dr. Marin aware.

## 2019-07-28 NOTE — OB PROVIDER H&P - ASSESSMENT
32 yo  @38w5d, GBS neg, here for IOL with pitocin  -admit to L&D  -stadol for pain management  -pitocin for induction  -IVF, clear liquid diet  -continuous EFM/toco      Dr. Marin and Dr. De La Torre aware.

## 2019-07-28 NOTE — CHART NOTE - NSCHARTNOTEFT_GEN_A_CORE
Patient seen and evaluated at bedside, following complaints of difficulty breathing, lightheadedness and severe headache after epidural. Patient was found to have altered mental status. She was able to open her eyes but unable to actively move limbs or speak. Anesthesia and PMD were at bedside. After several minutes and resuscitative measure, patient regained consciousness and was able to talk and move upper extremities.     Vital Signs  T(F): 97.6 (2019 12:27), Max: 98.4 (2019 08:40)  HR: 71 (2019 12:47) (66 - 88)  BP: 97/52 (2019 12:47) (86/53 - 158/91)  RR: 16 (2019 05:39) (16 - 20)  SpO2: 98% (2019 12:46) (93% - 99%)    Gen: patient not alert or oriented, unable to speak   CVS: RRR  Lungs: CTAB  Abd: soft, gravid, non tender    EFM: 150bpm/mod/no accels, late decels to 100 for 1 min -> after resusitation, tracing recovered to cat 1  [place]: 2-3mins  SVE: 50/-3 intact @0504 by Dr. De La Torre    A/P    33y  at 38w5d, GBS neg, for IOL with pitocin, s/p episode of unresponsiveness and hypotension, likely high spinal, now cat 1 tracing, maternal status reassured.    - epidural for pain management  - cont efm/toco  - cont to monitor vitals  - cont IV hydration, clear liquid diet    Dr. Wilson and Dr. Marin aware.

## 2019-07-28 NOTE — PROGRESS NOTE ADULT - ASSESSMENT
A/P:   33y  at 38w5d, GBS neg, for IOL with pitocin.   -continue pitocin  -epidural for pain management  -cont efm/toco  -cont to monitor vitals  -cont iv hydration, clear liquid diet    Will make Dr. Wilson and Dr. Marin aware.

## 2019-07-28 NOTE — OB PROVIDER H&P - NSHPPHYSICALEXAM_GEN_ALL_CORE
Vital Signs Last 24 Hrs  T(C): 36.8 (28 Jul 2019 05:39), Max: 36.8 (28 Jul 2019 05:39)  T(F): 98.3 (28 Jul 2019 05:39), Max: 98.3 (28 Jul 2019 05:39)  HR: 66 (28 Jul 2019 05:39) (66 - 75)  BP: 137/87 (28 Jul 2019 05:39) (126/84 - 137/87)  RR: 16 (28 Jul 2019 05:39) (16 - 20)    Gen: NAD, sitting comfortably between contractions  Abd: Gravid, soft, NT, palpable ctx  SVE: 2/50/-3, vtx, intact  EFM: 140/mod/+accels  [place]: q7-8m  Sono: vtx by sono

## 2019-07-28 NOTE — OB PROVIDER H&P - NSHPLABSRESULTS_GEN_ALL_CORE
Labs:  12/29/18  Hgb electrophoresis normal  HBsAG nonreactive  Rubella immune  Varicella immune  Fragile x neg  RPR negative  CF neg  SMA neg    1/28/19  NIPS neg  Sequential 1 neg    2/1/19  O pos, antibody neg    2/26/19  Sequential 2 neg    5/18/19      6/1/19  HIV nonreactive  Measles immune    7/4/19  GBS neg    sono:  33w0d 2266g (56%ile), cephalic, anterior placenta, MVP 58mm, BPP 8/8  18w5d 275g, transverse, anterior cord, cervix 37mm wnl, growth appropriate  12w5d ovaries normal, cervix 36mm wnl, single IUP, biometry equals dates, NT 1.3mm wnl

## 2019-07-28 NOTE — PROCEDURE NOTE - GENERAL PROCEDURE DETAILS
Epidural catheter placed easily and aseptically via NEW technique, NO CSF, heme, or paresthesiae noted.  Catheter secured, sterile dressing applied.  Pt. placed supine with L.U.D.

## 2019-07-29 LAB
AMPHET UR-MCNC: NEGATIVE — SIGNIFICANT CHANGE UP
BARBITURATES UR SCN-MCNC: NEGATIVE — SIGNIFICANT CHANGE UP
BENZODIAZ UR-MCNC: NEGATIVE — SIGNIFICANT CHANGE UP
BUPRENORPHINE SCREEN, URINE RESULT: NEGATIVE — SIGNIFICANT CHANGE UP
COCAINE METAB.OTHER UR-MCNC: NEGATIVE — SIGNIFICANT CHANGE UP
CREAT ?TM UR-MCNC: 107 MG/DL — SIGNIFICANT CHANGE UP
L&D DRUG SCREEN, URINE: SIGNIFICANT CHANGE UP
METHADONE UR-MCNC: NEGATIVE — SIGNIFICANT CHANGE UP
OPIATES UR-MCNC: NEGATIVE — SIGNIFICANT CHANGE UP
OXYCODONE UR-MCNC: NEGATIVE — SIGNIFICANT CHANGE UP
PCP UR-MCNC: NEGATIVE — SIGNIFICANT CHANGE UP
PROPOXYPHENE QUALITATIVE URINE RESULT: NEGATIVE — SIGNIFICANT CHANGE UP
PROT ?TM UR-MCNC: 33 MG/DLG/24H — SIGNIFICANT CHANGE UP
PROT/CREAT UR-RTO: 0.3 RATIO — HIGH (ref 0–0.2)

## 2019-07-29 RX ORDER — KETOROLAC TROMETHAMINE 30 MG/ML
30 SYRINGE (ML) INJECTION ONCE
Refills: 0 | Status: DISCONTINUED | OUTPATIENT
Start: 2019-07-29 | End: 2019-07-29

## 2019-07-29 RX ORDER — MAGNESIUM HYDROXIDE 400 MG/1
30 TABLET, CHEWABLE ORAL
Refills: 0 | Status: DISCONTINUED | OUTPATIENT
Start: 2019-07-29 | End: 2019-07-31

## 2019-07-29 RX ORDER — IBUPROFEN 200 MG
600 TABLET ORAL EVERY 6 HOURS
Refills: 0 | Status: DISCONTINUED | OUTPATIENT
Start: 2019-07-29 | End: 2019-07-31

## 2019-07-29 RX ORDER — DOCUSATE SODIUM 100 MG
100 CAPSULE ORAL
Refills: 0 | Status: DISCONTINUED | OUTPATIENT
Start: 2019-07-29 | End: 2019-07-31

## 2019-07-29 RX ORDER — AER TRAVELER 0.5 G/1
1 SOLUTION RECTAL; TOPICAL EVERY 4 HOURS
Refills: 0 | Status: DISCONTINUED | OUTPATIENT
Start: 2019-07-29 | End: 2019-07-31

## 2019-07-29 RX ORDER — DIBUCAINE 1 %
1 OINTMENT (GRAM) RECTAL EVERY 6 HOURS
Refills: 0 | Status: DISCONTINUED | OUTPATIENT
Start: 2019-07-29 | End: 2019-07-31

## 2019-07-29 RX ORDER — OXYTOCIN 10 UNIT/ML
333.33 VIAL (ML) INJECTION
Qty: 20 | Refills: 0 | Status: DISCONTINUED | OUTPATIENT
Start: 2019-07-29 | End: 2019-07-31

## 2019-07-29 RX ORDER — SIMETHICONE 80 MG/1
80 TABLET, CHEWABLE ORAL EVERY 4 HOURS
Refills: 0 | Status: DISCONTINUED | OUTPATIENT
Start: 2019-07-29 | End: 2019-07-31

## 2019-07-29 RX ORDER — ACETAMINOPHEN 500 MG
650 TABLET ORAL EVERY 6 HOURS
Refills: 0 | Status: DISCONTINUED | OUTPATIENT
Start: 2019-07-29 | End: 2019-07-31

## 2019-07-29 RX ORDER — LANOLIN
1 OINTMENT (GRAM) TOPICAL EVERY 6 HOURS
Refills: 0 | Status: DISCONTINUED | OUTPATIENT
Start: 2019-07-29 | End: 2019-07-31

## 2019-07-29 RX ORDER — DIPHENHYDRAMINE HCL 50 MG
25 CAPSULE ORAL EVERY 6 HOURS
Refills: 0 | Status: DISCONTINUED | OUTPATIENT
Start: 2019-07-29 | End: 2019-07-31

## 2019-07-29 RX ORDER — SODIUM CHLORIDE 9 MG/ML
3 INJECTION INTRAMUSCULAR; INTRAVENOUS; SUBCUTANEOUS EVERY 8 HOURS
Refills: 0 | Status: DISCONTINUED | OUTPATIENT
Start: 2019-07-29 | End: 2019-07-31

## 2019-07-29 RX ORDER — PRAMOXINE HYDROCHLORIDE 150 MG/15G
1 AEROSOL, FOAM RECTAL EVERY 4 HOURS
Refills: 0 | Status: DISCONTINUED | OUTPATIENT
Start: 2019-07-29 | End: 2019-07-31

## 2019-07-29 RX ORDER — GLYCERIN ADULT
1 SUPPOSITORY, RECTAL RECTAL AT BEDTIME
Refills: 0 | Status: DISCONTINUED | OUTPATIENT
Start: 2019-07-29 | End: 2019-07-31

## 2019-07-29 RX ORDER — HYDROCORTISONE 1 %
1 OINTMENT (GRAM) TOPICAL EVERY 6 HOURS
Refills: 0 | Status: DISCONTINUED | OUTPATIENT
Start: 2019-07-29 | End: 2019-07-31

## 2019-07-29 RX ORDER — BENZOCAINE 10 %
1 GEL (GRAM) MUCOUS MEMBRANE EVERY 6 HOURS
Refills: 0 | Status: DISCONTINUED | OUTPATIENT
Start: 2019-07-29 | End: 2019-07-31

## 2019-07-29 RX ADMIN — Medication 600 MILLIGRAM(S): at 23:20

## 2019-07-29 RX ADMIN — ONDANSETRON 4 MILLIGRAM(S): 8 TABLET, FILM COATED ORAL at 07:52

## 2019-07-29 RX ADMIN — Medication 1 APPLICATION(S): at 21:16

## 2019-07-29 RX ADMIN — Medication 1 APPLICATION(S): at 14:39

## 2019-07-29 RX ADMIN — Medication 30 MILLIGRAM(S): at 12:37

## 2019-07-29 RX ADMIN — Medication 600 MILLIGRAM(S): at 18:02

## 2019-07-29 RX ADMIN — SODIUM CHLORIDE 3 MILLILITER(S): 9 INJECTION INTRAMUSCULAR; INTRAVENOUS; SUBCUTANEOUS at 23:19

## 2019-07-29 RX ADMIN — SODIUM CHLORIDE 3 MILLILITER(S): 9 INJECTION INTRAMUSCULAR; INTRAVENOUS; SUBCUTANEOUS at 14:37

## 2019-07-29 RX ADMIN — Medication 1000 MILLIUNIT(S)/MIN: at 12:15

## 2019-07-29 RX ADMIN — Medication 30 MILLIGRAM(S): at 14:38

## 2019-07-29 NOTE — OB PROVIDER DELIVERY SUMMARY - NSPROVIDERDELIVERYNOTE_OBGYN_ALL_OB_FT
Pt became fully dilated and pushed well over intact perineum, delivery of head in BERTA position, can x 1 loose reduced, followed by delivery of shoulders and body without difficulty, live female infant, APGARs 9/9, 3-vessel cord + placenta complete, 2nd degree perineal laceration repaired with chromic, no complications

## 2019-07-29 NOTE — CHART NOTE - NSCHARTNOTEFT_GEN_A_CORE
Called by Dr. Marin for epidural top off.  Pt c/o low back, abdominal and vaginal pain/ pressure, 9/10.    Last VE: 10cm. Motor/ sensory levels assessed, R=L=T10.  Epidural catheter negative for CSF/ heme aspiration, 50mcg/ 50mcg Fentanyl + 6mL of 0.25% Bupivacaine administered.     OB RN at bedside, will continue to monitor pt.

## 2019-07-29 NOTE — PATIENT PROFILE, NEWBORN NICU. - ARE SIGNIFICANT INDICATORS COMPLETE.
Hyperbaric/Wound Care Patient Call    Who's Calling: PEDRO Tierney with [facility]  Their Callback #: [phone]    Patient : 1929    Problem/Reason for Call:         1) What symptoms are you experiencing?      2) Other: Kelsy RN calling to have Palliative wound care orders placed for patient. Patient's daughter was initially against this, but is now agreeable.     Active Problems:   Patient Active Problem List   Diagnosis   • Venous insufficiency   • Diabetes mellitus (CMS/HCC)   • Stasis ulcer of lower extremity (CMS/HCC)       Next Appointment Date: 10/1/2018    Last Appointment Date: 2018    Pharmacy Name/Phone Number Confirmed in Meds & Orders Tab: No: Not applicable    Patient Phone Number(s): HOME: [phone]   WORK: N/A   CELL: [phone]         [ Please inform the patient that their call will be returned within 24 hours]       No

## 2019-07-30 RX ADMIN — Medication 600 MILLIGRAM(S): at 19:10

## 2019-07-30 RX ADMIN — Medication 600 MILLIGRAM(S): at 23:28

## 2019-07-30 RX ADMIN — Medication 100 MILLIGRAM(S): at 10:26

## 2019-07-30 RX ADMIN — Medication 600 MILLIGRAM(S): at 06:08

## 2019-07-30 RX ADMIN — Medication 1 SPRAY(S): at 10:26

## 2019-07-30 RX ADMIN — Medication 650 MILLIGRAM(S): at 15:46

## 2019-07-30 RX ADMIN — Medication 1 APPLICATION(S): at 10:26

## 2019-07-30 RX ADMIN — Medication 600 MILLIGRAM(S): at 13:12

## 2019-07-30 RX ADMIN — SODIUM CHLORIDE 3 MILLILITER(S): 9 INJECTION INTRAMUSCULAR; INTRAVENOUS; SUBCUTANEOUS at 06:04

## 2019-07-30 RX ADMIN — Medication 650 MILLIGRAM(S): at 05:03

## 2019-07-30 RX ADMIN — SODIUM CHLORIDE 3 MILLILITER(S): 9 INJECTION INTRAMUSCULAR; INTRAVENOUS; SUBCUTANEOUS at 13:15

## 2019-07-30 RX ADMIN — Medication 650 MILLIGRAM(S): at 10:26

## 2019-07-31 ENCOUNTER — TRANSCRIPTION ENCOUNTER (OUTPATIENT)
Age: 34
End: 2019-07-31

## 2019-07-31 VITALS — SYSTOLIC BLOOD PRESSURE: 130 MMHG | DIASTOLIC BLOOD PRESSURE: 74 MMHG

## 2019-07-31 LAB
ALBUMIN SERPL ELPH-MCNC: 2.6 G/DL — LOW (ref 3.5–5.2)
ALP SERPL-CCNC: 121 U/L — HIGH (ref 30–115)
ALT FLD-CCNC: 8 U/L — SIGNIFICANT CHANGE UP (ref 0–41)
ANION GAP SERPL CALC-SCNC: 9 MMOL/L — SIGNIFICANT CHANGE UP (ref 7–14)
AST SERPL-CCNC: 17 U/L — SIGNIFICANT CHANGE UP (ref 0–41)
BASOPHILS # BLD AUTO: 0.01 K/UL — SIGNIFICANT CHANGE UP (ref 0–0.2)
BASOPHILS NFR BLD AUTO: 0.1 % — SIGNIFICANT CHANGE UP (ref 0–1)
BILIRUB SERPL-MCNC: 0.3 MG/DL — SIGNIFICANT CHANGE UP (ref 0.2–1.2)
BUN SERPL-MCNC: 8 MG/DL — LOW (ref 10–20)
CALCIUM SERPL-MCNC: 8.6 MG/DL — SIGNIFICANT CHANGE UP (ref 8.5–10.1)
CHLORIDE SERPL-SCNC: 107 MMOL/L — SIGNIFICANT CHANGE UP (ref 98–110)
CO2 SERPL-SCNC: 24 MMOL/L — SIGNIFICANT CHANGE UP (ref 17–32)
CREAT SERPL-MCNC: 0.5 MG/DL — LOW (ref 0.7–1.5)
EOSINOPHIL # BLD AUTO: 0.12 K/UL — SIGNIFICANT CHANGE UP (ref 0–0.7)
EOSINOPHIL NFR BLD AUTO: 1.1 % — SIGNIFICANT CHANGE UP (ref 0–8)
GLUCOSE SERPL-MCNC: 79 MG/DL — SIGNIFICANT CHANGE UP (ref 70–99)
HCT VFR BLD CALC: 24.5 % — LOW (ref 37–47)
HGB BLD-MCNC: 7.5 G/DL — LOW (ref 12–16)
IMM GRANULOCYTES NFR BLD AUTO: 0.4 % — HIGH (ref 0.1–0.3)
LYMPHOCYTES # BLD AUTO: 18.3 % — LOW (ref 20.5–51.1)
LYMPHOCYTES # BLD AUTO: 2.06 K/UL — SIGNIFICANT CHANGE UP (ref 1.2–3.4)
MCHC RBC-ENTMCNC: 21.7 PG — LOW (ref 27–31)
MCHC RBC-ENTMCNC: 30.6 G/DL — LOW (ref 32–37)
MCV RBC AUTO: 70.8 FL — LOW (ref 81–99)
MONOCYTES # BLD AUTO: 0.62 K/UL — HIGH (ref 0.1–0.6)
MONOCYTES NFR BLD AUTO: 5.5 % — SIGNIFICANT CHANGE UP (ref 1.7–9.3)
NEUTROPHILS # BLD AUTO: 8.41 K/UL — HIGH (ref 1.4–6.5)
NEUTROPHILS NFR BLD AUTO: 74.6 % — SIGNIFICANT CHANGE UP (ref 42.2–75.2)
NRBC # BLD: 0 /100 WBCS — SIGNIFICANT CHANGE UP (ref 0–0)
PLATELET # BLD AUTO: 212 K/UL — SIGNIFICANT CHANGE UP (ref 130–400)
POTASSIUM SERPL-MCNC: 4 MMOL/L — SIGNIFICANT CHANGE UP (ref 3.5–5)
POTASSIUM SERPL-SCNC: 4 MMOL/L — SIGNIFICANT CHANGE UP (ref 3.5–5)
PROT SERPL-MCNC: 5 G/DL — LOW (ref 6–8)
RBC # BLD: 3.46 M/UL — LOW (ref 4.2–5.4)
RBC # FLD: 16.6 % — HIGH (ref 11.5–14.5)
SODIUM SERPL-SCNC: 140 MMOL/L — SIGNIFICANT CHANGE UP (ref 135–146)
WBC # BLD: 11.27 K/UL — HIGH (ref 4.8–10.8)
WBC # FLD AUTO: 11.27 K/UL — HIGH (ref 4.8–10.8)

## 2019-07-31 RX ORDER — DOCUSATE SODIUM 100 MG
1 CAPSULE ORAL
Qty: 0 | Refills: 0 | DISCHARGE
Start: 2019-07-31

## 2019-07-31 RX ORDER — IBUPROFEN 200 MG
1 TABLET ORAL
Qty: 0 | Refills: 0 | DISCHARGE
Start: 2019-07-31

## 2019-07-31 RX ORDER — ACETAMINOPHEN 500 MG
2 TABLET ORAL
Qty: 0 | Refills: 0 | DISCHARGE
Start: 2019-07-31

## 2019-07-31 RX ADMIN — Medication 600 MILLIGRAM(S): at 05:57

## 2019-07-31 RX ADMIN — Medication 600 MILLIGRAM(S): at 11:56

## 2019-07-31 NOTE — DISCHARGE NOTE OB - PATIENT PORTAL LINK FT
You can access the [facility] Patient Portal, offered by [facility], by registering with the following website: [email or web address]

## 2019-07-31 NOTE — DISCHARGE NOTE OB - MEDICATION SUMMARY - MEDICATIONS TO TAKE
I will START or STAY ON the medications listed below when I get home from the hospital:    acetaminophen 325 mg oral tablet  -- 2 tab(s) by mouth every 6 hours, As Needed  -- Indication: For pain    ibuprofen 600 mg oral tablet  -- 1 tab(s) by mouth every 6 hours, As Needed  -- Indication: For pain    docusate sodium 100 mg oral capsule  -- 1 cap(s) by mouth 2 times a day, As needed, For stool softening  -- Indication: For constipation

## 2019-07-31 NOTE — DISCHARGE NOTE OB - HOSPITAL COURSE
19 @ 12:21    HPI:  34yo  @38w5d with JOHNNY 19 by LMP c/w 1st trimester sono here for ctx starting  @0000 q20min, progressing to q5min @0400.  The contractions last 30 sec-1min and are 8-9/10 pain.  Denies VB, LOF.  Reports good FM.  Last exam was on  2cm dilated.  Denies complications with this pregnancy.  Measles immune.  GBS neg. (2019 05:40)      PAST MEDICAL & SURGICAL HISTORY:  Migraine: not taking any medication for it  H/O dilation and curettage      POST PARTUM COURSE:   Pt met criteria for preelcampsia without severe features, BPs well controlled postpartum; otherwise uncomplicated labor and postpartum course       LABS:                        7.5    11.27 )-----------( 212      ( 2019 01:46 )             24.5       19 @ 01:46      140  |  107  |  8<L>  ----------------------------<  79  4.0   |  24  |  0.5<L>        Ca    8.6      2019 01:46    TPro  5.0<L>  /  Alb  2.6<L>  /  TBili  0.3  /  DBili  x   /  AST  17  /  ALT  8   /  AlkPhos  121<H>  19 @ 01:46        Allergies    No Known Allergies    Intolerances 19 @ 12:21    HPI:  32yo  @38w5d with JOHNNY 19 by LMP c/w 1st trimester sono here for ctx starting  @0000 q20min, progressing to q5min @0400.  The contractions last 30 sec-1min and are 8-9/10 pain.  Denies VB, LOF.  Reports good FM.  Last exam was on  2cm dilated.  Denies complications with this pregnancy.  Measles immune.  GBS neg. (2019 05:40)      PAST MEDICAL & SURGICAL HISTORY:  Migraine: not taking any medication for it  H/O dilation and curettage      POST PARTUM COURSE:   Pt met criteria for preelcampsia without severe features, BPs well controlled postpartum; otherwise uncomplicated labor and postpartum course, discharged PPD2       LABS:                        7.5    11.27 )-----------( 212      ( 2019 01:46 )             24.5       19 @ 01:46      140  |  107  |  8<L>  ----------------------------<  79  4.0   |  24  |  0.5<L>        Ca    8.6      2019 01:46    TPro  5.0<L>  /  Alb  2.6<L>  /  TBili  0.3  /  DBili  x   /  AST  17  /  ALT  8   /  AlkPhos  121<H>  19 @ 01:46        Allergies    No Known Allergies    Intolerances

## 2019-07-31 NOTE — PROGRESS NOTE ADULT - SUBJECTIVE AND OBJECTIVE BOX
PGY2 L&D Note    Patient seen at bedside, complaining of cramping pain.     T(F): 98.6 ( @ 04:30), Max: 98.78 ( @ 23:04)  HR: 99 ( @ 06:08)  BP: 139/75 ( @ 06:00) (86/53 - 158/91)  RR: 18 ( @ 04:30)  EFM: 155/mod./accel+/intermittent variable decels  TOCO: q2-3min  SVE: deferred, last exam at 0354 /-1 as per Dr. Marin's exam     Medications:  pitocin started at 0608 on , now at 16 mu/min  stadol at 0606 on , then at 1804 on   epidural started at 1145 on     Labs:                        10.2   7.37  )-----------( 236      ( 2019 05:30 )             33.3     Urinalysis Basic - ( 2019 05:30 )  Color: Light Yellow / Appearance: Slightly Turbid / S.011 / pH: x  Gluc: x / Ketone: Negative  / Bili: Negative / Urobili: <2 mg/dL   Blood: x / Protein: Negative / Nitrite: Negative   Leuk Esterase: Moderate / RBC: 1 /HPF / WBC 37 /HPF   Sq Epi: x / Non Sq Epi: 4 /HPF / Bacteria: Few    A/P:   34 yo  at 38w6d, GBS neg, here for IOL with pitocin, w/ gHTN, now BPs wnl, s/p epidural, on pitocin, in labor,     -continue pitocin  -pain management prn  -cont efm/toco  -f/u pending labs (UDS and UPr/Cr)  -cont to monitor vitals  -cont iv hydration    Dr. Hunt and Dr. Marin to be made aware.
PGY1 Note    Patient seen at bedside for evaluation of labor progression, doing well, no complaints. AROM clear @1415. Pain controlled with epidural.     T(F): 97.6 (12:27)  HR: 97 (17:36)  BP: 118/62 (17:31)  RR: 10 (13:00)    EFM: 140bpm/mod/+accels  TOCO: q2mins  SVE: 4/70/-2 @1718 by Dr. Marin    Medications:  pitocin started @0615 , now at 12mu/min  epidural @1146      Labs:                        10.2   7.37  )-----------( 236      ( 2019 05:30 )             33.3           ABO RH Interpretation: O POS (19 @ 05:30)    Antibody Screen: NEG (19 @ 05:30)    Urinalysis Basic - ( 2019 05:30 )    Color: Light Yellow / Appearance: Slightly Turbid / S.011 / pH: x  Gluc: x / Ketone: Negative  / Bili: Negative / Urobili: <2 mg/dL   Blood: x / Protein: Negative / Nitrite: Negative   Leuk Esterase: Moderate / RBC: 1 /HPF / WBC 37 /HPF   Sq Epi: x / Non Sq Epi: 4 /HPF / Bacteria: Few        Prenatal Syphilis Test: Nonreact (19 @ 05:30)
PGY1 Note    Patient seen at bedside for evaluation of labor progression, doing well, no complaints. Pt denies headaches, vision changes, SOB, chest pain, RUQ/epigastric pain, LE pain/swelling.     T(F): 98.4 (08:40)  HR: 77 (09:54)  BP: 146/86 (09:54)  RR: 16 (05:39)    Gen: AAOx3, no acute distress  CVS: RRR  Lungs: CTAB  Neuro: Reflexes 2+ UE b/l  Ext: no edema b/l    EFM: 140bpm/mod/+accels  TOCO: q3-4 mins  SVE: 2/50/-3 intact @0504 by Dr. De La Torre    Medications:  pitocin started @0615 at 10mu/min      Labs:                        10.2   7.37  )-----------( 236      ( 2019 05:30 )             33.3           ABO RH Interpretation: O POS (19 @ 05:30)    Antibody Screen: NEG (19 @ 05:30)    Urinalysis Basic - ( 2019 05:30 )    Color: Light Yellow / Appearance: Slightly Turbid / S.011 / pH: x  Gluc: x / Ketone: Negative  / Bili: Negative / Urobili: <2 mg/dL   Blood: x / Protein: Negative / Nitrite: Negative   Leuk Esterase: Moderate / RBC: 1 /HPF / WBC 37 /HPF   Sq Epi: x / Non Sq Epi: 4 /HPF / Bacteria: Few        Prenatal Syphilis Test: Nonreact (19 @ 05:30)
PGY1 Note    Patient seen at bedside. Pt was complaining of increased vaginal and rectal pressure. Pt was also complaining of increased abdominal pain and inquired about anesthesia increasing her epidural.     T(F): 98.42 ( @ 19:59), Max: 98.42 ( @ 19:59)  HR: 88 ( @ 22:06)  BP: 136/64 ( @ 22:01) (86/53 - 158/91)  RR: 10 ( @ 13:00)    EFM: 145bpm/moderate variability/+accels; Cat 1 tracing  TOCO: q2-3mins  SVE: /-2 per Dr. Marin    Medications:  butorphanol Injectable: 2 ( @ 06:06)  butorphanol Injectable: 2 ( @ 18:04)  diphenhydrAMINE   Injectable: 25 ( @ 18:05)  diphenhydrAMINE   Injectable: 50 ( @ 06:06)  lactated ringers.: 125 ( @ 05:35)  oxytocin Infusion: 2 ( @ 05:35)      Labs:                        10.2   7.37  )-----------( 236      ( 2019 05:30 )             33.3           Prenatal Syphilis Test: Nonreact ( @ 05:30)  Antibody Screen: NEG (19 @ 05:30)    Urinalysis Basic - ( 2019 05:30 )    Color: Light Yellow / Appearance: Slightly Turbid / S.011 / pH: x  Gluc: x / Ketone: Negative  / Bili: Negative / Urobili: <2 mg/dL   Blood: x / Protein: Negative / Nitrite: Negative   Leuk Esterase: Moderate / RBC: 1 /HPF / WBC 37 /HPF   Sq Epi: x / Non Sq Epi: 4 /HPF / Bacteria: Few
Pt seen at bedside, no complaints, nursing    Vital Signs Last 24 Hrs  T(C): 35.7 (30 Jul 2019 15:38), Max: 36.4 (30 Jul 2019 03:06)  T(F): 96.2 (30 Jul 2019 15:38), Max: 97.6 (30 Jul 2019 03:06)  HR: 75 (30 Jul 2019 15:38) (71 - 97)  BP: 119/71 (30 Jul 2019 15:38) (108/59 - 124/70)  BP(mean): 90 (30 Jul 2019 15:38) (90 - 90)  RR: 19 (30 Jul 2019 15:38) (18 - 19)    Resp - CTA b/l  CVS - S1S2+, RRR  Breasts - soft, NT  Abd - soft, NTND, BS+, uterus - firm  VE - Moderate lochia, perineum- intact  Ext - No Homans                          10.2   7.37  )-----------( 236      ( 28 Jul 2019 05:30 )             33.3     O POS, Rubella - Immune, Rubeola - Immune, RPR - NR
Pt seen at bedside, no complaints, nursing    Vital Signs Last 24 Hrs  T(C): 36 (31 Jul 2019 07:31), Max: 36.3 (31 Jul 2019 03:44)  T(F): 96.8 (31 Jul 2019 07:31), Max: 97.4 (31 Jul 2019 03:44)  HR: 80 (31 Jul 2019 07:31) (71 - 83)  BP: 130/74 (31 Jul 2019 11:00) (109/69 - 135/74)  BP(mean): 90 (30 Jul 2019 15:38) (90 - 90)  RR: 20 (31 Jul 2019 07:31) (18 - 20)    Resp - CTA b/l  CVS - S1S2+, RRR  Breasts - soft, NT  Abd - soft, NTND, BS+, uterus - firm  VE - Minimal lochia, perineum- intact  Ext - No Homans                          7.5    11.27 )-----------( 212      ( 31 Jul 2019 01:46 )             24.5

## 2019-07-31 NOTE — DISCHARGE NOTE OB - CARE PROVIDER_API CALL
Madhav Marin)  Obstetrics and Gynecology  [address] [place] [address]  Phone: [phone]  Fax: [phone]  Follow Up Time:

## 2019-07-31 NOTE — PROGRESS NOTE ADULT - ASSESSMENT
IMP: s/p , Anemia - PPD # 2 - Doing well    Plan: dc home, NSAID's/ FeSO4/ PNV's, f/u office - 6 wks

## 2019-08-04 DIAGNOSIS — I95.89 OTHER HYPOTENSION: ICD-10-CM

## 2019-08-04 DIAGNOSIS — Z3A.38 38 WEEKS GESTATION OF PREGNANCY: ICD-10-CM

## 2019-09-09 ENCOUNTER — OUTPATIENT (OUTPATIENT)
Dept: OUTPATIENT SERVICES | Facility: HOSPITAL | Age: 34
LOS: 1 days | Discharge: HOME | End: 2019-09-09

## 2019-09-09 ENCOUNTER — RESULT REVIEW (OUTPATIENT)
Age: 34
End: 2019-09-09

## 2019-09-09 DIAGNOSIS — Z98.890 OTHER SPECIFIED POSTPROCEDURAL STATES: Chronic | ICD-10-CM

## 2019-09-11 DIAGNOSIS — Z12.4 ENCOUNTER FOR SCREENING FOR MALIGNANT NEOPLASM OF CERVIX: ICD-10-CM

## 2020-02-23 NOTE — ED ADULT TRIAGE NOTE - PRO INTERPRETER NEED 2
Patient: Kayley Putnam    Procedure(s):  IRRIGATION AND DEBRIDEMENT, BREAST    Diagnosis:* No pre-op diagnosis entered *  Diagnosis Additional Information: No value filed.    Anesthesia Type:  General, LMA    Note:  Anesthesia Post Evaluation    Patient location during evaluation: PACU  Patient participation: Able to fully participate in evaluation  Level of consciousness: awake and alert  Pain management: adequate  Airway patency: patent  Cardiovascular status: acceptable  Respiratory status: acceptable  Hydration status: acceptable  PONV: controlled             Last vitals:  Vitals:    02/22/20 1650 02/22/20 1655 02/22/20 1700   BP: (!) 153/60 (!) 145/52 (!) 153/56   Pulse: 70 62 68   Resp: 16 19 8   Temp:      SpO2: 91% (!) 89% 96%         Electronically Signed By: Aly Russo MD  February 22, 2020  7:01 PM   English

## 2020-04-26 ENCOUNTER — MESSAGE (OUTPATIENT)
Age: 35
End: 2020-04-26

## 2020-05-02 ENCOUNTER — APPOINTMENT (OUTPATIENT)
Dept: DISASTER EMERGENCY | Facility: HOSPITAL | Age: 35
End: 2020-05-02

## 2020-05-04 LAB
SARS-COV-2 IGG SERPL IA-ACNC: <0.1 INDEX
SARS-COV-2 IGG SERPL QL IA: NEGATIVE

## 2020-06-15 NOTE — PATIENT PROFILE, NEWBORN NICU. - BSA (M2)
Pt is here for prenatal appointment. She denies spotting, cramping, contractions. She has been having some pelvic pressure. After obtaining consent, and per orders of Dr. Miah Umana, injection of Boostrix given in Left deltoid by Liam Hahn. Patient instructed to remain in clinic for 20 minutes afterwards, and to report any adverse reaction to me immediately. 0.19

## 2020-09-18 NOTE — PATIENT PROFILE, NEWBORN NICU. - WEIGHT METHOD
Pharmacy refill request received for the following:     Disp Refill Start     omeprazole (PRILOSEC) 20 MG capsule 30 capsule 5 4/26/2019     Sig - Route: Take 1 capsule by mouth daily. - Oral    Patient taking differently: Take 20 mg by mouth daily as needed.         Sent to pharmacy as: Omeprazole 20 MG Oral Capsule Delayed Release      Last visit: 3/7/17 for an EGD  Next visit: None currently scheduled, message sent to the PSR to contact the patient for a visit.      After reviewing the patient's chart, medication was refilled per provider's standing orders.           infant

## 2020-10-23 NOTE — ED PROVIDER NOTE - CPE EDP [facility] NORM
Grecia is calling stating she has a bladder infection. She states she was on some routine medication for UTI and her last one was about 6 months ago. She is asking if it would be a possibility of getting that medication or if she would need to provide a urine sample.    Please advise and call patient at her home phone.    normal...

## 2021-01-05 NOTE — ED ADULT TRIAGE NOTE - CADM TRG TX PRIOR TO ARRIVAL
Breast Surgery Post-Operative Instructions    The following instructions will provide helpful information that will assist your recovery. These are designed to be general guidelines. If you have any concerns or questions, do not hesitate to contact your surgeon at the [facility] ([phone]).     Immediately After Surgery  Your breast biopsy incision was injected with a long-acting medication prior to closure. This numbing effect will last 3-5 hours. Once this medication has worn off, you should only experience mild discomfort. Effects of the general anesthesia may make you feel groggy for the rest of the day. Listen to your body and rest when you are tired.     Day after Surgery  You may have had an elastic bandage (ACE wrap) placed around your chest at the completion of your surgery that immobilizes the breast. The breast tends to experience much less pain if it is immobilized.   • You may remove the ACE bandage and gauze pads the following day after surgery  • Leave steri-strips (narrow, white tape) in place until they fall off  • Wear a snug fitting sports bra that provides good support. Wear the sports bra both day and night (removing to wash and shower) for the first few days.   • You may shower 24 hours after surgery  • Do not soak in bath tub until after you have been seen and examined at post op visit  • You may resume normal diet  • If you had a sentinel lymph node biopsy, your urine may be blue for a day or so as the dye is excreted. This is harmless    Activity  • You may resume your normal activity the day after surgery  • Refrain from heavy lifting or strenuous exercise  • You may drive when you are no longer taking prescription (narcotic) pain medication  • You may begin arm exercise after your post-operative office visit    Medications  • Avoid aspirin or ibuprofen for two (2) days after surgery  • You may resume your usual medications except for aspirin or other blood thinners  • You  may be given a prescription for narcotic pain medications (usually Vicodin or Norco) upon discharge. Do not be afraid to use it if you are uncomfortable. If you take the narcotic, use a stool softener or gentle laxative, as constipation is common.     Wound Care  • To protect your clothing, keep a gauze dressing on the wound until the wound is completely dry and without drainage  • The wound is closed with absorbable sutures (stiches) located under the skin, therefore, no suture will need to be removed  • The steri-strips (narrow, white tape) should remain in place for 7-10 days. If they have not come off within 10 days, remove them yourself. It is best to do this in the shower and peel the strips off slowly  • Watch for signs of infection: Please monitor the incision for any signs or symptoms of infection. Call immediately for any increased redness, swelling, warmth or drainage or you have a fever of 101.5 or greater.     Pathology Results  We will contact you by phone when we receive your pathology results.  • Small, excisional biopsy: final report will be available within 3-5 business days  • Lumpectomy or mastectomy: final report will be available within 7-8 business days  Surgeon will review your pathology results with you at your post-operative visit and you will be given a copy of your report for your records.    Post-Operative Visit  If you do not already have an appointment, please call the office the day after your surgery to schedule your post-operative visit. Your visit should take place within 7 days of surgery.   none

## 2023-05-08 NOTE — OB RN TRIAGE NOTE - AS TEMP SITE
oral Arava Counseling:  Patient counseled regarding adverse effects of Arava including but not limited to nausea, vomiting, abnormalities in liver function tests. Patients may develop mouth sores, rash, diarrhea, and abnormalities in blood counts. The patient understands that monitoring is required including LFTs and blood counts.  There is a rare possibility of scarring of the liver and lung problems that can occur when taking methotrexate. Persistent nausea, loss of appetite, pale stools, dark urine, cough, and shortness of breath should be reported immediately. Patient advised to discontinue Arava treatment and consult with a physician prior to attempting conception. The patient will have to undergo a treatment to eliminate Arava from the body prior to conception.

## 2024-08-06 NOTE — ED PROVIDER NOTE - DISPOSITION TYPE
SEE BELOW FOR OUR NEW PHONE NUMBER!!!      Thanks for visiting us today!    Remember these important phone numbers:    [phone] for phone nurses during the day and our nurse answering service at night    [phone]   for scheduling or changing future appointments    [phone] for the Po[facility] Control [facility]    When leaving a message for our staff, please include:   the spelling of your child’s full name and date of birth  your full name and relationship to child  best phone number and time to reach you   reason for the call      We strongly recommend that all children age 6 months and older receive the COVID-19 vaccine. Call our office at [phone]  to schedule.      Where's the best place on the internet to look up health information about kids?  HealthyChildren.org  From the [employer]        Is your child signed up for [employer]? If you do this, you can message us rather than playing phone tag! You can also look at labs, pay your bills, and do some scheduling. Go to your own account first. (If you don't have one yet, you can set one up at the website below or at your doctor's office).  Using a web browser (not the phone dawood), on the right hand side of your page, click the button marked \"Request Access to my Child's Records.\" Fill out the information. In a few days your child's information will be linked to your account. It's that simple!! Here is the website for more information:    [email or web address]        --------------------------------------------------------------------------------------------------------------------      
DISCHARGE